# Patient Record
Sex: FEMALE | Race: WHITE | NOT HISPANIC OR LATINO | Employment: OTHER | URBAN - NONMETROPOLITAN AREA
[De-identification: names, ages, dates, MRNs, and addresses within clinical notes are randomized per-mention and may not be internally consistent; named-entity substitution may affect disease eponyms.]

---

## 2017-02-02 ENCOUNTER — OFFICE VISIT (OUTPATIENT)
Dept: FAMILY MEDICINE CLINIC | Facility: CLINIC | Age: 37
End: 2017-02-02

## 2017-02-02 VITALS
OXYGEN SATURATION: 98 % | SYSTOLIC BLOOD PRESSURE: 118 MMHG | HEIGHT: 66 IN | DIASTOLIC BLOOD PRESSURE: 80 MMHG | WEIGHT: 226 LBS | BODY MASS INDEX: 36.32 KG/M2 | HEART RATE: 98 BPM

## 2017-02-02 DIAGNOSIS — R11.0 NAUSEA: ICD-10-CM

## 2017-02-02 DIAGNOSIS — R10.11 RIGHT UPPER QUADRANT ABDOMINAL PAIN: ICD-10-CM

## 2017-02-02 DIAGNOSIS — R14.0 BLOATING: ICD-10-CM

## 2017-02-02 DIAGNOSIS — R19.7 DIARRHEA, UNSPECIFIED TYPE: ICD-10-CM

## 2017-02-02 LAB
DEPRECATED RDW RBC AUTO: 43.7 FL (ref 37–54)
ERYTHROCYTE [DISTWIDTH] IN BLOOD BY AUTOMATED COUNT: 13.9 % (ref 11.3–14.5)
HCT VFR BLD AUTO: 44 % (ref 34.5–44)
HGB BLD-MCNC: 15 G/DL (ref 11.5–15.5)
MCH RBC QN AUTO: 29.4 PG (ref 27–31)
MCHC RBC AUTO-ENTMCNC: 34.1 G/DL (ref 32–36)
MCV RBC AUTO: 86.3 FL (ref 80–99)
PLATELET # BLD AUTO: 168 10*3/MM3 (ref 150–450)
PMV BLD AUTO: 12.1 FL (ref 6–12)
RBC # BLD AUTO: 5.1 10*6/MM3 (ref 3.89–5.14)
WBC NRBC COR # BLD: 9.21 10*3/MM3 (ref 3.5–10.8)

## 2017-02-02 PROCEDURE — 99214 OFFICE O/P EST MOD 30 MIN: CPT | Performed by: NURSE PRACTITIONER

## 2017-02-02 PROCEDURE — 85027 COMPLETE CBC AUTOMATED: CPT | Performed by: NURSE PRACTITIONER

## 2017-02-02 PROCEDURE — 36415 COLL VENOUS BLD VENIPUNCTURE: CPT | Performed by: NURSE PRACTITIONER

## 2017-02-02 RX ORDER — PANTOPRAZOLE SODIUM 40 MG/1
40 TABLET, DELAYED RELEASE ORAL DAILY
Qty: 30 TABLET | Refills: 2 | Status: SHIPPED | OUTPATIENT
Start: 2017-02-02 | End: 2017-03-04

## 2017-02-02 NOTE — PROGRESS NOTES
"Subjective   September Jordan Weller is a 36 y.o. female.     HPI Comments: The patient is here today for RUQ pain that has been intermittent for the past 2 months.  She complains of episodes of nausea (no vomiting), RUQ abdominal pain, bloating, belching, as well as alternating diarrhea and constipation.  She states at the beginning of her \"episodes\" she has diarrhea and then constipation.  She states she has had 3-4 episodes of the pain and symptoms over the past 2 months.  She has not tried anything to relieve her symptoms but states they resolve spontaenously.  She cannot relate the symptoms to eating or not eating or any specific food types.  She states she still has her gallbladder.        The following portions of the patient's history were reviewed and updated as appropriate: allergies, current medications, past family history, past medical history, past social history, past surgical history and problem list.    Review of Systems   Constitutional: Negative.    HENT: Negative.    Eyes: Negative.    Respiratory: Negative.    Cardiovascular: Negative.    Gastrointestinal: Positive for abdominal pain, constipation, diarrhea and nausea.        Bloating, belching   Endocrine: Negative.    Genitourinary: Negative.    Musculoskeletal: Negative.    Skin: Negative.    Allergic/Immunologic: Negative.    Neurological: Negative.  Negative for dizziness, syncope, weakness and numbness.   Hematological: Negative.  Negative for adenopathy.   Psychiatric/Behavioral: Negative for confusion and suicidal ideas. The patient is not nervous/anxious.        Objective   Physical Exam   Constitutional: She is oriented to person, place, and time. Vital signs are normal. She appears well-developed and well-nourished. No distress.   HENT:   Head: Normocephalic.   Mouth/Throat: No oropharyngeal exudate.   Eyes: Conjunctivae and lids are normal.   Neck: No tracheal deviation present. No thyromegaly present.   Cardiovascular: Normal rate, " regular rhythm, S1 normal, S2 normal and normal heart sounds.    No murmur heard.  Pulmonary/Chest: Effort normal and breath sounds normal. No respiratory distress. She has no wheezes. She has no rales. She exhibits no tenderness.   Abdominal: Soft. Normal appearance and bowel sounds are normal. She exhibits no distension and no mass. There is no hepatosplenomegaly or splenomegaly. There is tenderness in the right upper quadrant, right lower quadrant and left lower quadrant. There is no rebound and no guarding. No hernia.   Lymphadenopathy:     She has no cervical adenopathy.        Right cervical: No superficial cervical, no deep cervical and no posterior cervical adenopathy present.       Left cervical: No superficial cervical, no deep cervical and no posterior cervical adenopathy present.   Neurological: She is alert and oriented to person, place, and time. Coordination and gait normal.   Skin: Skin is warm, dry and intact. No rash noted.   Psychiatric: She has a normal mood and affect. Her speech is normal and behavior is normal. Judgment and thought content normal.   Nursing note and vitals reviewed.      Assessment/Plan   September was seen today for abdominal pain.    Diagnoses and all orders for this visit:    Nausea  -     pantoprazole (PROTONIX) 40 MG EC tablet; Take 1 tablet by mouth Daily for 30 days.  -     US Gallbladder; Future  -     CBC (No Diff)  -     Helicobacter Pylori, IgA IgG IgM    Diarrhea, unspecified type  -     pantoprazole (PROTONIX) 40 MG EC tablet; Take 1 tablet by mouth Daily for 30 days.  -     US Gallbladder; Future  -     CBC (No Diff)  -     Helicobacter Pylori, IgA IgG IgM    Bloating  -     pantoprazole (PROTONIX) 40 MG EC tablet; Take 1 tablet by mouth Daily for 30 days.  -     US Gallbladder; Future  -     CBC (No Diff)  -     Helicobacter Pylori, IgA IgG IgM    Right upper quadrant abdominal pain  -     pantoprazole (PROTONIX) 40 MG EC tablet; Take 1 tablet by mouth Daily for  30 days.  -     US Gallbladder; Future  -     CBC (No Diff)  -     Helicobacter Pylori, IgA IgG IgM     Protonix prescribed for control of patient's nausea, diarrhea, bloating, and RUQ abdominal pain.  The patient was instructed to take the medication daily on an empty stomach 30 minutes prior to eating.      An ultra sound of her gallbladder was also ordered to evaluate the cause of her symptoms.  A CBC was drawn today in clinic to assess for inflammation signs related to her symptoms.  The patient was also tested for H-Pylori with a lab draw due to her ongoing symptoms.      Patient voiced understanding of all instructions and denied further questions.  Patient was encouraged to keep me informed of any acute changes, lack of improvement, or any new concerning symptoms.  .I will contact patient regarding test results and provide instructions regarding any necessary changes in plan of care    Patient to return to clinic PRN symptoms worsening and based on results of her ordered tests.

## 2017-02-03 ENCOUNTER — HOSPITAL ENCOUNTER (OUTPATIENT)
Dept: ULTRASOUND IMAGING | Facility: HOSPITAL | Age: 37
Discharge: HOME | End: 2017-02-03
Admitting: NURSE PRACTITIONER

## 2017-02-03 DIAGNOSIS — R10.11 RIGHT UPPER QUADRANT ABDOMINAL PAIN: ICD-10-CM

## 2017-02-03 DIAGNOSIS — R19.7 DIARRHEA, UNSPECIFIED TYPE: ICD-10-CM

## 2017-02-03 DIAGNOSIS — R11.0 NAUSEA: ICD-10-CM

## 2017-02-03 DIAGNOSIS — R14.0 BLOATING: ICD-10-CM

## 2017-02-03 PROCEDURE — 76705 ECHO EXAM OF ABDOMEN: CPT

## 2017-02-06 ENCOUNTER — TELEPHONE (OUTPATIENT)
Dept: FAMILY MEDICINE CLINIC | Facility: CLINIC | Age: 37
End: 2017-02-06

## 2017-02-06 LAB
H PYLORI IGA SER IA-ACNC: <9 UNITS (ref 0–8.9)
H PYLORI IGG SER IA-ACNC: <0.9 U/ML (ref 0–0.8)
H PYLORI IGM SER-ACNC: <9 UNITS (ref 0–8.9)

## 2017-02-06 NOTE — TELEPHONE ENCOUNTER
----- Message from Ashlyn Saldana MA sent at 2/6/2017  2:41 PM EST -----  Pt notified of results    ----- Message -----     From: GERSON Churchill     Sent: 2/3/2017  11:14 AM       To: Ashlyn Saldana MA    Her labs are all normal

## 2017-02-07 ENCOUNTER — TELEPHONE (OUTPATIENT)
Dept: INTERNAL MEDICINE | Facility: CLINIC | Age: 37
End: 2017-02-07

## 2017-02-07 ENCOUNTER — TELEPHONE (OUTPATIENT)
Dept: FAMILY MEDICINE CLINIC | Facility: CLINIC | Age: 37
End: 2017-02-07

## 2017-02-07 DIAGNOSIS — K82.8 SLUDGE IN GALLBLADDER: Primary | ICD-10-CM

## 2017-02-07 DIAGNOSIS — R11.0 NAUSEA: ICD-10-CM

## 2017-02-07 DIAGNOSIS — K80.20 CHOLELITHIASIS WITHOUT CHOLECYSTITIS: ICD-10-CM

## 2017-02-07 DIAGNOSIS — R10.11 RIGHT UPPER QUADRANT ABDOMINAL PAIN: ICD-10-CM

## 2017-02-07 DIAGNOSIS — R10.11 RIGHT UPPER QUADRANT PAIN: Primary | ICD-10-CM

## 2017-02-07 NOTE — TELEPHONE ENCOUNTER
----- Message from Ashlyn Saldana MA sent at 2/7/2017  6:42 PM EST -----  Pt notified of results    ----- Message -----     From: GERSON Churchill     Sent: 2/7/2017   8:51 AM       To: Ashlyn Saldana MA    The bacteria in her stomach was negative

## 2017-02-14 ENCOUNTER — HOSPITAL ENCOUNTER (OUTPATIENT)
Dept: NUCLEAR MEDICINE | Facility: HOSPITAL | Age: 37
Discharge: HOME | End: 2017-02-14

## 2017-02-14 DIAGNOSIS — R10.11 RIGHT UPPER QUADRANT ABDOMINAL PAIN: ICD-10-CM

## 2017-02-14 DIAGNOSIS — K82.8 SLUDGE IN GALLBLADDER: ICD-10-CM

## 2017-02-14 DIAGNOSIS — R11.0 NAUSEA: ICD-10-CM

## 2017-02-14 PROCEDURE — A9537 TC99M MEBROFENIN: HCPCS | Performed by: FAMILY MEDICINE

## 2017-02-14 PROCEDURE — 78227 HEPATOBIL SYST IMAGE W/DRUG: CPT

## 2017-02-14 PROCEDURE — 0 TECHNETIUM TC 99M MEBROFENIN KIT: Performed by: FAMILY MEDICINE

## 2017-02-14 RX ORDER — KIT FOR THE PREPARATION OF TECHNETIUM TC 99M MEBROFENIN 45 MG/10ML
1 INJECTION, POWDER, LYOPHILIZED, FOR SOLUTION INTRAVENOUS
Status: COMPLETED | OUTPATIENT
Start: 2017-02-14 | End: 2017-02-14

## 2017-02-14 RX ADMIN — MEBROFENIN 1 DOSE: 45 INJECTION, POWDER, LYOPHILIZED, FOR SOLUTION INTRAVENOUS at 11:50

## 2017-02-16 ENCOUNTER — TELEPHONE (OUTPATIENT)
Dept: FAMILY MEDICINE CLINIC | Facility: CLINIC | Age: 37
End: 2017-02-16

## 2017-02-16 NOTE — TELEPHONE ENCOUNTER
----- Message from Ashlyn Saldana MA sent at 2/16/2017  6:02 PM EST -----   Pt notified and she said that she was going to call Dr Ramírez office tomorrow and ask her if she still wants her to see the gen surgeon she scheduled her for or what she wants her to do.    ----- Message -----     From: Ashlyn Saldana MA     Sent: 2/14/2017   6:47 PM       To: Ashlyn Saldana MA     Tried calling pt no answer    ----- Message -----     From: GERSON Churchill     Sent: 2/14/2017   4:25 PM       To: Ashlyn Saldana MA    Her HIDA scan is normal, so if she is still having problems with her stomach, I will refer her to GI

## 2017-02-20 ENCOUNTER — OFFICE VISIT (OUTPATIENT)
Dept: SURGERY | Facility: CLINIC | Age: 37
End: 2017-02-20

## 2017-02-20 VITALS
HEART RATE: 80 BPM | WEIGHT: 227 LBS | TEMPERATURE: 97.4 F | SYSTOLIC BLOOD PRESSURE: 102 MMHG | OXYGEN SATURATION: 99 % | DIASTOLIC BLOOD PRESSURE: 64 MMHG | BODY MASS INDEX: 36.48 KG/M2 | HEIGHT: 66 IN

## 2017-02-20 DIAGNOSIS — R10.11 RUQ PAIN: Primary | ICD-10-CM

## 2017-02-20 DIAGNOSIS — Z01.818 PRE-OP EXAM: ICD-10-CM

## 2017-02-20 DIAGNOSIS — R11.0 NAUSEA: ICD-10-CM

## 2017-02-20 PROCEDURE — 99202 OFFICE O/P NEW SF 15 MIN: CPT | Performed by: SURGERY

## 2017-02-20 NOTE — PROGRESS NOTES
SABRINA Mcarthur MD  New Clinic Visit/ H&P    [place]        1980 02/20/2017  Faviola Ramírez MD    Chief Complaint   Patient presents with   • Cholelithiasis     Patient c/o RUQ pain, nausea,and diarrhea per 3 months. US of Gallbladder doen 2/3/17. Hida scan done 2/14/17    patient has classic gallbladder symptoms in the right upper quadrant associated with nausea, fatty food intolerances, some rushes to the bathroom, and some heavy gaseous distention.  Ultrasound of her gallbladder showed some sludge.  Her HIDA scan was normal.  No past medical history on file.  Past Surgical History   Procedure Laterality Date   • Knee surgery       2001   • Tubal abdominal ligation       2002     No Known Allergies  Family History   Problem Relation Age of Onset   • Diabetes Mother    • Heart failure Maternal Grandmother    • Heart attack Paternal Grandmother      Social History     Social History   • Marital status:      Spouse name: N/A   • Number of children: N/A   • Years of education: N/A     Occupational History   • Not on file.     Social History Main Topics   • Smoking status: Never Smoker   • Smokeless tobacco: Not on file   • Alcohol use No   • Drug use: No   • Sexual activity: Not on file     Other Topics Concern   • Not on file     Social History Narrative   • No narrative on file     Review of Systems   Constitutional: Negative.    HENT: Negative.    Eyes: Negative.    Respiratory: Negative.    Cardiovascular: Negative.    Gastrointestinal: Positive for abdominal distention, abdominal pain, constipation, diarrhea and nausea.   Endocrine: Negative.    Genitourinary: Negative.    Musculoskeletal: Negative.    Skin: Negative.    Allergic/Immunologic: Negative.    Neurological: Positive for dizziness.   Hematological: Negative.    Psychiatric/Behavioral: Negative.      Vitals:    02/20/17 1336   BP: 102/64   Pulse: 80   Temp: 97.4 °F (36.3 °C)   SpO2: 99%     Physical Exam   The patient is alert and  oriented with no jaundice and no symptoms presently.  Procedures     Assessment/Diagnosis  Biliary dyskinesia  Probable cholelithiasis.  Additional:  Plan:    Feel like she needs to have her gallbladder removed and she is agreeable.  Workup started.      20 minutes was spent face-to-face with patient counseling and discussing procedures     VLADIMIR Mcarthur MD

## 2017-03-20 ENCOUNTER — APPOINTMENT (OUTPATIENT)
Dept: PREADMISSION TESTING | Facility: HOSPITAL | Age: 37
End: 2017-03-20

## 2017-03-20 ENCOUNTER — HOSPITAL ENCOUNTER (OUTPATIENT)
Dept: GENERAL RADIOLOGY | Facility: HOSPITAL | Age: 37
Discharge: HOME | End: 2017-03-20
Admitting: SURGERY

## 2017-03-20 VITALS
SYSTOLIC BLOOD PRESSURE: 125 MMHG | WEIGHT: 220 LBS | HEIGHT: 66 IN | BODY MASS INDEX: 35.36 KG/M2 | DIASTOLIC BLOOD PRESSURE: 75 MMHG

## 2017-03-20 DIAGNOSIS — Z01.818 PRE-OP EXAM: ICD-10-CM

## 2017-03-20 LAB
ALBUMIN SERPL-MCNC: 4.5 G/DL (ref 3.5–5)
ALBUMIN/GLOB SERPL: 1.5 G/DL (ref 1–2)
ALP SERPL-CCNC: 63 U/L (ref 38–126)
ALT SERPL W P-5'-P-CCNC: 23 U/L (ref 13–69)
ANION GAP SERPL CALCULATED.3IONS-SCNC: 10.6 MMOL/L
AST SERPL-CCNC: 27 U/L (ref 15–46)
B-HCG UR QL: NEGATIVE
BACTERIA UR QL AUTO: ABNORMAL /HPF
BASOPHILS # BLD AUTO: 0.03 10*3/MM3 (ref 0–0.2)
BASOPHILS NFR BLD AUTO: 0.5 % (ref 0–2.5)
BILIRUB SERPL-MCNC: 0.6 MG/DL (ref 0.2–1.3)
BILIRUB UR QL STRIP: NEGATIVE
BUN BLD-MCNC: 10 MG/DL (ref 7–20)
BUN/CREAT SERPL: 11.1 (ref 7.1–23.5)
CALCIUM SPEC-SCNC: 9.6 MG/DL (ref 8.4–10.2)
CHLORIDE SERPL-SCNC: 104 MMOL/L (ref 98–107)
CLARITY UR: CLEAR
CO2 SERPL-SCNC: 30 MMOL/L (ref 26–30)
COLOR UR: YELLOW
CREAT BLD-MCNC: 0.9 MG/DL (ref 0.6–1.3)
DEPRECATED RDW RBC AUTO: 40.2 FL (ref 37–54)
EOSINOPHIL # BLD AUTO: 0.13 10*3/MM3 (ref 0–0.7)
EOSINOPHIL NFR BLD AUTO: 2.3 % (ref 0–7)
ERYTHROCYTE [DISTWIDTH] IN BLOOD BY AUTOMATED COUNT: 13.1 % (ref 11.5–14.5)
GFR SERPL CREATININE-BSD FRML MDRD: 70 ML/MIN/1.73
GLOBULIN UR ELPH-MCNC: 3.1 GM/DL
GLUCOSE BLD-MCNC: 66 MG/DL (ref 74–98)
GLUCOSE UR STRIP-MCNC: NEGATIVE MG/DL
HCT VFR BLD AUTO: 40.8 % (ref 37–47)
HGB BLD-MCNC: 14 G/DL (ref 12–16)
HGB UR QL STRIP.AUTO: NEGATIVE
HYALINE CASTS UR QL AUTO: ABNORMAL /LPF
IMM GRANULOCYTES # BLD: 0.02 10*3/MM3 (ref 0–0.06)
IMM GRANULOCYTES NFR BLD: 0.4 % (ref 0–0.6)
KETONES UR QL STRIP: NEGATIVE
LEUKOCYTE ESTERASE UR QL STRIP.AUTO: NEGATIVE
LYMPHOCYTES # BLD AUTO: 1.59 10*3/MM3 (ref 0.6–3.4)
LYMPHOCYTES NFR BLD AUTO: 28.1 % (ref 10–50)
MCH RBC QN AUTO: 29.1 PG (ref 27–31)
MCHC RBC AUTO-ENTMCNC: 34.3 G/DL (ref 30–37)
MCV RBC AUTO: 84.8 FL (ref 81–99)
MONOCYTES # BLD AUTO: 0.48 10*3/MM3 (ref 0–0.9)
MONOCYTES NFR BLD AUTO: 8.5 % (ref 0–12)
NEUTROPHILS # BLD AUTO: 3.4 10*3/MM3 (ref 2–6.9)
NEUTROPHILS NFR BLD AUTO: 60.2 % (ref 37–80)
NITRITE UR QL STRIP: NEGATIVE
NRBC BLD MANUAL-RTO: 0 /100 WBC (ref 0–0)
PH UR STRIP.AUTO: 5.5 [PH] (ref 5–8)
PLATELET # BLD AUTO: 191 10*3/MM3 (ref 130–400)
PMV BLD AUTO: 11 FL (ref 6–12)
POTASSIUM BLD-SCNC: 3.6 MMOL/L (ref 3.5–5.1)
PROT SERPL-MCNC: 7.6 G/DL (ref 6.3–8.2)
PROT UR QL STRIP: NEGATIVE
RBC # BLD AUTO: 4.81 10*6/MM3 (ref 4.2–5.4)
RBC # UR: ABNORMAL /HPF
REF LAB TEST METHOD: ABNORMAL
SODIUM BLD-SCNC: 141 MMOL/L (ref 137–145)
SP GR UR STRIP: 1.01 (ref 1–1.03)
SQUAMOUS #/AREA URNS HPF: ABNORMAL /HPF
UROBILINOGEN UR QL STRIP: NORMAL
WBC NRBC COR # BLD: 5.65 10*3/MM3 (ref 4.8–10.8)
WBC UR QL AUTO: ABNORMAL /HPF

## 2017-03-20 PROCEDURE — 80053 COMPREHEN METABOLIC PANEL: CPT | Performed by: SURGERY

## 2017-03-20 PROCEDURE — 85025 COMPLETE CBC W/AUTO DIFF WBC: CPT | Performed by: SURGERY

## 2017-03-20 PROCEDURE — 93005 ELECTROCARDIOGRAM TRACING: CPT | Performed by: SURGERY

## 2017-03-20 PROCEDURE — 71020 HC CHEST PA AND LATERAL: CPT

## 2017-03-20 PROCEDURE — 81025 URINE PREGNANCY TEST: CPT | Performed by: SURGERY

## 2017-03-20 PROCEDURE — 81001 URINALYSIS AUTO W/SCOPE: CPT | Performed by: SURGERY

## 2017-03-22 ENCOUNTER — OFFICE VISIT (OUTPATIENT)
Dept: SURGERY | Facility: CLINIC | Age: 37
End: 2017-03-22

## 2017-03-22 VITALS
HEART RATE: 90 BPM | SYSTOLIC BLOOD PRESSURE: 120 MMHG | DIASTOLIC BLOOD PRESSURE: 60 MMHG | TEMPERATURE: 98.8 F | OXYGEN SATURATION: 98 % | BODY MASS INDEX: 35.36 KG/M2 | WEIGHT: 220 LBS | HEIGHT: 66 IN

## 2017-03-22 DIAGNOSIS — K80.20 CALCULUS OF GALLBLADDER WITHOUT CHOLECYSTITIS WITHOUT OBSTRUCTION: Primary | ICD-10-CM

## 2017-03-22 PROCEDURE — 99213 OFFICE O/P EST LOW 20 MIN: CPT | Performed by: SURGERY

## 2017-03-22 NOTE — PROGRESS NOTES
BRIGIDA Mcarthur MD  Follow-up/Clinic Visit    Margoth Weller        1980 03/22/2017  Faviola Ramírez MD    Chief Complaint   Patient presents with   • Pre-Op Visit     Cholecystectomy sheduled for 3/27/17    she has been pretty much symptom-free while she was staying on the diet.  She did have symptoms one time when she had green leafy vegetables.  Review of Systems   Constitutional: Negative.    HENT: Negative.    Eyes: Negative.    Respiratory: Negative.    Cardiovascular: Positive for leg swelling.   Gastrointestinal: Positive for abdominal pain, diarrhea and nausea.   Endocrine: Negative.    Genitourinary: Negative.    Musculoskeletal: Negative.    Skin: Negative.    Allergic/Immunologic: Negative.    Neurological: Negative.    Hematological: Negative.    Psychiatric/Behavioral: Negative.     legs will swell some if she sits for long periods  No Known Allergies  Vitals:    03/22/17 0905   BP: 120/60   Pulse: 90   Temp: 98.8 °F (37.1 °C)   SpO2: 98%     Physical Exam   Alert and oriented and cooperative  EENT: Not remarkable  Neck: Not remarkable  Chest: Clear  Heart: Regular with no murmurs  Abdomen: She has scars at her umbilicus where she has had previous laparoscopic procedures.  Extremities: No edema  Procedures     Assessment/Diagnosis  Cholelithiasis  Additional:  Plan: Cholecystectomy and operative cholangiography      :15 minutes was spent face-to-face with the patient counseling and discussing procedure on     VLADIMIR Mcarthur MD

## 2017-03-27 ENCOUNTER — APPOINTMENT (OUTPATIENT)
Dept: GENERAL RADIOLOGY | Facility: HOSPITAL | Age: 37
End: 2017-03-27
Attending: SURGERY

## 2017-03-27 ENCOUNTER — ANESTHESIA EVENT (OUTPATIENT)
Dept: PERIOP | Facility: HOSPITAL | Age: 37
End: 2017-03-27

## 2017-03-27 ENCOUNTER — HOSPITAL ENCOUNTER (OUTPATIENT)
Facility: HOSPITAL | Age: 37
Setting detail: SURGERY ADMIT
Discharge: HOME | End: 2017-03-27
Attending: SURGERY | Admitting: SURGERY

## 2017-03-27 ENCOUNTER — ANESTHESIA (OUTPATIENT)
Dept: PERIOP | Facility: HOSPITAL | Age: 37
End: 2017-03-27

## 2017-03-27 VITALS
RESPIRATION RATE: 18 BRPM | DIASTOLIC BLOOD PRESSURE: 75 MMHG | SYSTOLIC BLOOD PRESSURE: 126 MMHG | OXYGEN SATURATION: 96 % | TEMPERATURE: 97.2 F | HEART RATE: 76 BPM

## 2017-03-27 DIAGNOSIS — R10.11 RUQ PAIN: ICD-10-CM

## 2017-03-27 DIAGNOSIS — R11.0 NAUSEA: ICD-10-CM

## 2017-03-27 LAB — B-HCG UR QL: NEGATIVE

## 2017-03-27 PROCEDURE — 74000 HC ABDOMEN KUB: CPT

## 2017-03-27 PROCEDURE — C1758 CATHETER, URETERAL: HCPCS | Performed by: SURGERY

## 2017-03-27 PROCEDURE — 25010000002 MIDAZOLAM PER 1 MG: Performed by: NURSE ANESTHETIST, CERTIFIED REGISTERED

## 2017-03-27 PROCEDURE — 25010000002 PROPOFOL 200 MG/20ML EMULSION: Performed by: NURSE ANESTHETIST, CERTIFIED REGISTERED

## 2017-03-27 PROCEDURE — 81025 URINE PREGNANCY TEST: CPT | Performed by: SURGERY

## 2017-03-27 PROCEDURE — 25010000002 MIDAZOLAM PER 1 MG

## 2017-03-27 PROCEDURE — 25010000002 FENTANYL CITRATE (PF) 100 MCG/2ML SOLUTION: Performed by: NURSE ANESTHETIST, CERTIFIED REGISTERED

## 2017-03-27 PROCEDURE — 25010000002 HYDROMORPHONE PER 4 MG

## 2017-03-27 PROCEDURE — 47562 LAPAROSCOPIC CHOLECYSTECTOMY: CPT | Performed by: SURGERY

## 2017-03-27 PROCEDURE — 25010000002 HYDROMORPHONE PER 4 MG: Performed by: NURSE ANESTHETIST, CERTIFIED REGISTERED

## 2017-03-27 PROCEDURE — 25010000002 KETOROLAC TROMETHAMINE PER 15 MG: Performed by: NURSE ANESTHETIST, CERTIFIED REGISTERED

## 2017-03-27 PROCEDURE — 25010000002 MEPERIDINE PER 100 MG

## 2017-03-27 PROCEDURE — 0 IOVERSOL 64 % SOLUTION: Performed by: SURGERY

## 2017-03-27 PROCEDURE — 25010000003 AMPICILLIN-SULBACTAM PER 1.5 G: Performed by: SURGERY

## 2017-03-27 PROCEDURE — 25010000002 SUCCINYLCHOLINE PER 20 MG: Performed by: NURSE ANESTHETIST, CERTIFIED REGISTERED

## 2017-03-27 RX ORDER — ROCURONIUM BROMIDE 10 MG/ML
INJECTION, SOLUTION INTRAVENOUS AS NEEDED
Status: DISCONTINUED | OUTPATIENT
Start: 2017-03-27 | End: 2017-03-27 | Stop reason: SURG

## 2017-03-27 RX ORDER — ACETAMINOPHEN 10 MG/ML
INJECTION, SOLUTION INTRAVENOUS
Status: COMPLETED
Start: 2017-03-27 | End: 2017-03-27

## 2017-03-27 RX ORDER — MIDAZOLAM HYDROCHLORIDE 1 MG/ML
2 INJECTION INTRAMUSCULAR; INTRAVENOUS ONCE
Status: COMPLETED | OUTPATIENT
Start: 2017-03-27 | End: 2017-03-27

## 2017-03-27 RX ORDER — NAPROXEN SODIUM 220 MG
220 TABLET ORAL 2 TIMES DAILY PRN
COMMUNITY
End: 2017-04-05

## 2017-03-27 RX ORDER — PROMETHAZINE HYDROCHLORIDE 25 MG/1
25 TABLET ORAL ONCE AS NEEDED
Status: DISCONTINUED | OUTPATIENT
Start: 2017-03-27 | End: 2017-03-27 | Stop reason: HOSPADM

## 2017-03-27 RX ORDER — HYDROMORPHONE HCL 110MG/55ML
PATIENT CONTROLLED ANALGESIA SYRINGE INTRAVENOUS AS NEEDED
Status: DISCONTINUED | OUTPATIENT
Start: 2017-03-27 | End: 2017-03-27 | Stop reason: SURG

## 2017-03-27 RX ORDER — SODIUM CHLORIDE 0.9 % (FLUSH) 0.9 %
1-10 SYRINGE (ML) INJECTION AS NEEDED
Status: DISCONTINUED | OUTPATIENT
Start: 2017-03-27 | End: 2017-03-27 | Stop reason: HOSPADM

## 2017-03-27 RX ORDER — SCOLOPAMINE TRANSDERMAL SYSTEM 1 MG/1
1 PATCH, EXTENDED RELEASE TRANSDERMAL ONCE
Status: DISCONTINUED | OUTPATIENT
Start: 2017-03-27 | End: 2017-03-27 | Stop reason: HOSPADM

## 2017-03-27 RX ORDER — PROMETHAZINE HYDROCHLORIDE 25 MG/ML
6.25 INJECTION, SOLUTION INTRAMUSCULAR; INTRAVENOUS ONCE AS NEEDED
Status: DISCONTINUED | OUTPATIENT
Start: 2017-03-27 | End: 2017-03-27 | Stop reason: HOSPADM

## 2017-03-27 RX ORDER — BACITRACIN 50000 [USP'U]/1
INJECTION, POWDER, LYOPHILIZED, FOR SOLUTION INTRAMUSCULAR
Status: DISCONTINUED
Start: 2017-03-27 | End: 2017-03-27 | Stop reason: HOSPADM

## 2017-03-27 RX ORDER — MEPERIDINE HYDROCHLORIDE 25 MG/ML
INJECTION INTRAMUSCULAR; INTRAVENOUS; SUBCUTANEOUS
Status: COMPLETED
Start: 2017-03-27 | End: 2017-03-27

## 2017-03-27 RX ORDER — FENTANYL CITRATE 50 UG/ML
INJECTION, SOLUTION INTRAMUSCULAR; INTRAVENOUS AS NEEDED
Status: DISCONTINUED | OUTPATIENT
Start: 2017-03-27 | End: 2017-03-27 | Stop reason: SURG

## 2017-03-27 RX ORDER — AMOXICILLIN AND CLAVULANATE POTASSIUM 875; 125 MG/1; MG/1
1 TABLET, FILM COATED ORAL 2 TIMES DAILY
Qty: 7 TABLET | Refills: 0 | Status: SHIPPED | OUTPATIENT
Start: 2017-03-27 | End: 2017-03-31

## 2017-03-27 RX ORDER — MIDAZOLAM HYDROCHLORIDE 1 MG/ML
INJECTION INTRAMUSCULAR; INTRAVENOUS
Status: COMPLETED
Start: 2017-03-27 | End: 2017-03-27

## 2017-03-27 RX ORDER — BUPIVACAINE HYDROCHLORIDE AND EPINEPHRINE 5; 5 MG/ML; UG/ML
INJECTION, SOLUTION EPIDURAL; INTRACAUDAL; PERINEURAL
Status: DISCONTINUED
Start: 2017-03-27 | End: 2017-03-27 | Stop reason: HOSPADM

## 2017-03-27 RX ORDER — MEPERIDINE HYDROCHLORIDE 25 MG/ML
12.5 INJECTION INTRAMUSCULAR; INTRAVENOUS; SUBCUTANEOUS
Status: DISCONTINUED | OUTPATIENT
Start: 2017-03-27 | End: 2017-03-27 | Stop reason: HOSPADM

## 2017-03-27 RX ORDER — ONDANSETRON 2 MG/ML
4 INJECTION INTRAMUSCULAR; INTRAVENOUS ONCE AS NEEDED
Status: DISCONTINUED | OUTPATIENT
Start: 2017-03-27 | End: 2017-03-27 | Stop reason: HOSPADM

## 2017-03-27 RX ORDER — MEPERIDINE HYDROCHLORIDE 25 MG/ML
12.5 INJECTION INTRAMUSCULAR; INTRAVENOUS; SUBCUTANEOUS ONCE
Status: COMPLETED | OUTPATIENT
Start: 2017-03-27 | End: 2017-03-27

## 2017-03-27 RX ORDER — MIDAZOLAM HYDROCHLORIDE 1 MG/ML
INJECTION INTRAMUSCULAR; INTRAVENOUS AS NEEDED
Status: DISCONTINUED | OUTPATIENT
Start: 2017-03-27 | End: 2017-03-27 | Stop reason: SURG

## 2017-03-27 RX ORDER — KETOROLAC TROMETHAMINE 30 MG/ML
INJECTION, SOLUTION INTRAMUSCULAR; INTRAVENOUS AS NEEDED
Status: DISCONTINUED | OUTPATIENT
Start: 2017-03-27 | End: 2017-03-27 | Stop reason: SURG

## 2017-03-27 RX ORDER — GLYCOPYRROLATE 0.2 MG/ML
INJECTION INTRAMUSCULAR; INTRAVENOUS
Status: COMPLETED
Start: 2017-03-27 | End: 2017-03-27

## 2017-03-27 RX ORDER — SUCCINYLCHOLINE CHLORIDE 20 MG/ML
INJECTION INTRAMUSCULAR; INTRAVENOUS AS NEEDED
Status: DISCONTINUED | OUTPATIENT
Start: 2017-03-27 | End: 2017-03-27 | Stop reason: SURG

## 2017-03-27 RX ORDER — OXYCODONE HYDROCHLORIDE AND ACETAMINOPHEN 5; 325 MG/1; MG/1
TABLET ORAL
Status: DISPENSED
Start: 2017-03-27 | End: 2017-03-28

## 2017-03-27 RX ORDER — OXYCODONE HYDROCHLORIDE AND ACETAMINOPHEN 5; 325 MG/1; MG/1
1 TABLET ORAL EVERY 6 HOURS PRN
Qty: 20 TABLET | Refills: 0 | Status: SHIPPED | OUTPATIENT
Start: 2017-03-27 | End: 2017-03-30

## 2017-03-27 RX ORDER — GLYCOPYRROLATE 0.2 MG/ML
0.2 INJECTION INTRAMUSCULAR; INTRAVENOUS ONCE
Status: COMPLETED | OUTPATIENT
Start: 2017-03-27 | End: 2017-03-27

## 2017-03-27 RX ORDER — BUPIVACAINE HYDROCHLORIDE 5 MG/ML
INJECTION, SOLUTION EPIDURAL; INTRACAUDAL
Status: DISCONTINUED
Start: 2017-03-27 | End: 2017-03-27 | Stop reason: WASHOUT

## 2017-03-27 RX ORDER — LIDOCAINE HYDROCHLORIDE 10 MG/ML
INJECTION, SOLUTION INFILTRATION; PERINEURAL
Status: DISCONTINUED
Start: 2017-03-27 | End: 2017-03-27 | Stop reason: WASHOUT

## 2017-03-27 RX ORDER — PROPOFOL 10 MG/ML
INJECTION, EMULSION INTRAVENOUS AS NEEDED
Status: DISCONTINUED | OUTPATIENT
Start: 2017-03-27 | End: 2017-03-27 | Stop reason: SURG

## 2017-03-27 RX ORDER — SCOLOPAMINE TRANSDERMAL SYSTEM 1 MG/1
PATCH, EXTENDED RELEASE TRANSDERMAL
Status: DISCONTINUED
Start: 2017-03-27 | End: 2017-03-27 | Stop reason: HOSPADM

## 2017-03-27 RX ORDER — OXYCODONE HYDROCHLORIDE AND ACETAMINOPHEN 5; 325 MG/1; MG/1
1 TABLET ORAL EVERY 6 HOURS PRN
Status: COMPLETED | OUTPATIENT
Start: 2017-03-27 | End: 2017-03-27

## 2017-03-27 RX ORDER — PROMETHAZINE HYDROCHLORIDE 25 MG/1
25 SUPPOSITORY RECTAL ONCE AS NEEDED
Status: DISCONTINUED | OUTPATIENT
Start: 2017-03-27 | End: 2017-03-27 | Stop reason: HOSPADM

## 2017-03-27 RX ORDER — BUPIVACAINE HYDROCHLORIDE AND EPINEPHRINE 5; 5 MG/ML; UG/ML
INJECTION, SOLUTION PERINEURAL AS NEEDED
Status: DISCONTINUED | OUTPATIENT
Start: 2017-03-27 | End: 2017-03-27 | Stop reason: HOSPADM

## 2017-03-27 RX ORDER — SODIUM CHLORIDE, SODIUM LACTATE, POTASSIUM CHLORIDE, CALCIUM CHLORIDE 600; 310; 30; 20 MG/100ML; MG/100ML; MG/100ML; MG/100ML
100 INJECTION, SOLUTION INTRAVENOUS CONTINUOUS PRN
Status: DISCONTINUED | OUTPATIENT
Start: 2017-03-27 | End: 2017-03-27 | Stop reason: HOSPADM

## 2017-03-27 RX ADMIN — SUCCINYLCHOLINE CHLORIDE 120 MG: 20 INJECTION, SOLUTION INTRAMUSCULAR; INTRAVENOUS at 09:11

## 2017-03-27 RX ADMIN — PROPOFOL 200 MG: 10 INJECTION, EMULSION INTRAVENOUS at 09:11

## 2017-03-27 RX ADMIN — GLYCOPYRROLATE 0.2 MG: 0.2 INJECTION, SOLUTION INTRAMUSCULAR; INTRAVENOUS at 08:58

## 2017-03-27 RX ADMIN — GLYCOPYRROLATE 0.2 MG: 0.2 INJECTION INTRAMUSCULAR; INTRAVENOUS at 08:58

## 2017-03-27 RX ADMIN — SCOLOPAMINE TRANSDERMAL SYSTEM 1 PATCH: 1 PATCH, EXTENDED RELEASE TRANSDERMAL at 08:59

## 2017-03-27 RX ADMIN — ACETAMINOPHEN 1000 MG: 10 INJECTION, SOLUTION INTRAVENOUS at 09:19

## 2017-03-27 RX ADMIN — ROCURONIUM BROMIDE 5 MG: 10 INJECTION INTRAVENOUS at 09:11

## 2017-03-27 RX ADMIN — HYDROMORPHONE HYDROCHLORIDE 0.5 MG: 2 INJECTION, SOLUTION INTRAMUSCULAR; INTRAVENOUS; SUBCUTANEOUS at 10:38

## 2017-03-27 RX ADMIN — ROCURONIUM BROMIDE 10 MG: 10 INJECTION INTRAVENOUS at 10:09

## 2017-03-27 RX ADMIN — MIDAZOLAM HYDROCHLORIDE 2 MG: 1 INJECTION INTRAMUSCULAR; INTRAVENOUS at 08:57

## 2017-03-27 RX ADMIN — ROCURONIUM BROMIDE 10 MG: 10 INJECTION INTRAVENOUS at 09:54

## 2017-03-27 RX ADMIN — MIDAZOLAM HYDROCHLORIDE 2 MG: 1 INJECTION, SOLUTION INTRAMUSCULAR; INTRAVENOUS at 08:57

## 2017-03-27 RX ADMIN — HYDROMORPHONE HYDROCHLORIDE 0.5 MG: 2 INJECTION, SOLUTION INTRAMUSCULAR; INTRAVENOUS; SUBCUTANEOUS at 10:28

## 2017-03-27 RX ADMIN — LIDOCAINE HYDROCHLORIDE 60 MG: 20 INJECTION, SOLUTION INTRAVENOUS at 09:11

## 2017-03-27 RX ADMIN — FENTANYL CITRATE 50 MCG: 50 INJECTION, SOLUTION INTRAMUSCULAR; INTRAVENOUS at 09:47

## 2017-03-27 RX ADMIN — SCOPALAMINE 1 PATCH: 1 PATCH, EXTENDED RELEASE TRANSDERMAL at 08:59

## 2017-03-27 RX ADMIN — ROCURONIUM BROMIDE 10 MG: 10 INJECTION INTRAVENOUS at 09:39

## 2017-03-27 RX ADMIN — FENTANYL CITRATE 100 MCG: 50 INJECTION, SOLUTION INTRAMUSCULAR; INTRAVENOUS at 09:10

## 2017-03-27 RX ADMIN — SODIUM CHLORIDE, POTASSIUM CHLORIDE, SODIUM LACTATE AND CALCIUM CHLORIDE: 600; 310; 30; 20 INJECTION, SOLUTION INTRAVENOUS at 10:34

## 2017-03-27 RX ADMIN — MEPERIDINE HYDROCHLORIDE 12.5 MG: 25 INJECTION, SOLUTION INTRAMUSCULAR; INTRAVENOUS; SUBCUTANEOUS at 08:59

## 2017-03-27 RX ADMIN — KETOROLAC TROMETHAMINE 30 MG: 30 INJECTION, SOLUTION INTRAMUSCULAR at 10:39

## 2017-03-27 RX ADMIN — SODIUM CHLORIDE, POTASSIUM CHLORIDE, SODIUM LACTATE AND CALCIUM CHLORIDE 100 ML/HR: 600; 310; 30; 20 INJECTION, SOLUTION INTRAVENOUS at 07:47

## 2017-03-27 RX ADMIN — OXYCODONE HYDROCHLORIDE AND ACETAMINOPHEN 1 TABLET: 5; 325 TABLET ORAL at 12:20

## 2017-03-27 RX ADMIN — HYDROMORPHONE HYDROCHLORIDE 0.5 MG: 1 INJECTION, SOLUTION INTRAMUSCULAR; INTRAVENOUS; SUBCUTANEOUS at 11:11

## 2017-03-27 RX ADMIN — Medication 0.5 MG: at 11:11

## 2017-03-27 RX ADMIN — SODIUM CHLORIDE 3 G: 9 INJECTION, SOLUTION INTRAVENOUS at 09:13

## 2017-03-27 RX ADMIN — ROCURONIUM BROMIDE 25 MG: 10 INJECTION INTRAVENOUS at 09:19

## 2017-03-27 RX ADMIN — MEPERIDINE HYDROCHLORIDE 12.5 MG: 25 INJECTION INTRAMUSCULAR; INTRAVENOUS; SUBCUTANEOUS at 08:59

## 2017-03-27 RX ADMIN — MIDAZOLAM HYDROCHLORIDE 2 MG: 1 INJECTION, SOLUTION INTRAMUSCULAR; INTRAVENOUS at 09:10

## 2017-03-27 NOTE — OP NOTE
Laparoscopic Cholecystectomy with IOC Procedure Note    Indications: This patient presents with symptomatic gallbladder disease and will undergo laparoscopic cholecystectomy.    Pre-operative Diagnosis: Chronic cholecystitis    Post-operative Diagnosis: Chronic cholecystitis    Surgeon: Aleksandr Mcarthur MD     Assistants:     Anesthesia: General endotracheal anesthesia    ASA Class: 1    Procedure Details   . The patient was taken to Operating Room, identified as September Lee Hall and the procedure verified as Laparoscopic Cholecystectomy with Intraoperative Cholangiograms. A Time Out was held and the above information confirmed.    Prior to the induction of general anesthesia, antibiotic prophylaxis was administered. General endotracheal anesthesia was then administered and tolerated well. After the induction, the abdomen was prepped in the usual sterile fashion. The patient was positioned in the supine position with the left arm comfortably tucked, along with some reverse Trendelenburg.    An inferior umbilical incision was made and Veress needle inserted and the peritoneal cavity inflated without difficulty.  5 mm port was placed and scope inserted and peritoneal cavity examined.  Showing mild enlargement of the uterus and the gallbladder was white and thickened.        the fundus grasped and retracted cephalad. Adhesions were lysed bluntly and with the electrocautery where indicated, taking care not to injure any adjacent organs or viscus. The infundibulum was grasped and retracted laterally, exposing the peritoneum overlying the triangle of Calot. This was then divided and exposed in a blunt fashion. The cystic duct was clearly identified and bluntly dissected circumferentially. The junctions of the gallbladder and cystic duct were clearly identified prior to the division of any linear structure .     An incision was made in the cystic duct and the cholangiogram catheter introduced.  was then instructed the  tubing had not been filled with saline and the catheter was removed and filled with saline.  An attempt to reenter the catheter was made and the lumen could not be entered.  Additional incision was made and the catheter again was placed after dilatation and the duct fell apart.  The hand was Easily visualized and retracted upward and there was no other structures attached.  It was then doubly hemoclipped.      The gallbladder was dissected from the liver bed in retrograde fashion with the electrocautery. The gallbladder was removed. The liver bed was irrigated and inspected.  additional Hemostasis was achieved with the electrocautery. Copious irrigation was utilized and was repeatedly aspirated until clear all particulate matter.    Pneumoperitoneum was completely reduced after viewing removal of the trocars under direct vision. No bleeding was seen.  A 0 PDS was placed here and using the Endo Close and tied affecting a good closure of the fascia.  Rest of the area and fluid was suctioned and evacuated prior to the closure.  The subcutaneous was approximated with 4-0 PDS and skin of each port with 5-0 PDS subcuticular sutures and Steri-Strips.      Findings:  Cholecystitis Cholelithiasis    Estimated Blood Loss: 4cc           Drains:            Total IV Fluids:        Specimens: Gallbladder             Complications:           Disposition: {op note disposition:3           Condition: good

## 2017-03-27 NOTE — ANESTHESIA POSTPROCEDURE EVALUATION
Patient: September Jordan Weller    Procedure Summary     Date Anesthesia Start Anesthesia Stop Room / Location    03/27/17 0904 1057  [facility] / [facility]       Procedure Diagnosis Surgeon Provider    CHOLECYSTECTOMY LAPAROSCOPIC  (N/A Abdomen) Nausea; RUQ pain  (Nausea [R11.0]; RUQ pain [R10.11]) MD Tiffany Cruz CRNA          Anesthesia Type: general  Last vitals  /72 (03/27/17 1135)    Temp 97.2 °F (36.2 °C) (03/27/17 1100)    Pulse 56 (03/27/17 1135)   Resp 12 (03/27/17 1135)    SpO2 92 % (03/27/17 1135)      Post Anesthesia Care and Evaluation    Patient location during evaluation: PACU  Patient participation: complete - patient participated  Level of consciousness: awake and alert  Pain score: 6 (pt comfortable, pain with deep breaths)  Pain management: satisfactory to patient  Airway patency: patent  Anesthetic complications: No anesthetic complications  PONV Status: none  Cardiovascular status: blood pressure returned to baseline  Respiratory status: acceptable  Hydration status: acceptable

## 2017-03-27 NOTE — ANESTHESIA PREPROCEDURE EVALUATION
Anesthesia Evaluation     Patient summary reviewed and Nursing notes reviewed   history of anesthetic complications: PONV prolonged sedation  NPO Status: > 8 hours   Airway   Mallampati: I  TM distance: >3 FB  no difficulty expected  Dental - normal exam     Comment: Perm retainer lower    Pulmonary - normal exam   (-) pneumonia, COPD, asthma, shortness of breath, sleep apnea, not a smoker, pulmonary embolism    ROS comment: CXR: NAD  Cardiovascular - normal exam  Exercise tolerance: good (4-7 METS)    ECG reviewed    (-) pacemaker, hypertension, past MI, CAD, dysrhythmias, HUTTON, cardiac stents, CABG, hyperlipidemia    ROS comment: EKG: NSR    Neuro/Psych  (+) dizziness/light headedness,    (-) seizures, TIA, CVA, tremors, weakness, numbness  GI/Hepatic/Renal/Endo    (-)  obesity, GERD, PUD, hepatitis, liver disease, renal disease, diabetes, hypothyroidism, hyperthyroidism    Musculoskeletal     (-) back pain, gait problem, joint swelling, myalgias, chronic pain      ROS comment: Plantar fasciitis  Abdominal    Substance History   (+) alcohol use (once yearly),      OB/GYN      Comment: Negative urine pregnancy      Other        (-) blood dyscrasia, arthritis, history of cancer  ROS/Med Hx Other: Vertigo                            Anesthesia Plan    ASA 2     general   (Risks and benefits discussed including risk of aspiration, recall and dental damage. All patient questions answered. Will continue with POC.    Scope patch)  intravenous induction   Anesthetic plan and risks discussed with patient.

## 2017-03-27 NOTE — PLAN OF CARE
Problem: Perioperative Period (Adult)  Intervention: Promote Pulmonary Hygiene and Secretion Clearance    03/27/17 1110   Promote Aggressive Pulmonary Hygiene/Secretion Management   Cough And Deep Breathing done with encouragement       Intervention: Monitor/Manage Pain    03/27/17 1110   Safety Interventions   Medication Review/Management medications reviewed   Manage Acute Burn Pain   Pain Management Interventions medicated

## 2017-03-27 NOTE — PLAN OF CARE
Problem: Perioperative Period (Adult)  Goal: Signs and Symptoms of Listed Potential Problems Will be Absent or Manageable (Perioperative Period)  Outcome: Ongoing (interventions implemented as appropriate)    03/27/17 0729   Perioperative Period   Problems Assessed (Perioperative Period) all   Problems Present (Perioperative Period) none

## 2017-03-27 NOTE — ANESTHESIA PROCEDURE NOTES
Airway  Urgency: elective    Airway not difficult    General Information and Staff    Patient location during procedure: OR  CRNA: SCOTT ALFARO    Indications and Patient Condition  Indications for airway management: airway protection    Preoxygenated: yes  Mask difficulty assessment: 1 - vent by mask    Final Airway Details  Final airway type: endotracheal airway      Successful airway: ETT  Cuffed: yes   Successful intubation technique: direct laryngoscopy  Facilitating devices/methods: intubating stylet  Endotracheal tube insertion site: oral  Blade: Rasheeda  Blade size: #3  ETT size: 7.0 mm  Cormack-Lehane Classification: grade IIa - partial view of glottis  Placement verified by: chest auscultation and capnometry   Cuff volume (mL): 6  Measured from: lips  ETT to lips (cm): 20  Number of attempts at approach: 1    Additional Comments  Teeth as pre-op

## 2017-03-30 ENCOUNTER — OFFICE VISIT (OUTPATIENT)
Dept: SURGERY | Facility: CLINIC | Age: 37
End: 2017-03-30

## 2017-03-30 VITALS
HEART RATE: 103 BPM | SYSTOLIC BLOOD PRESSURE: 116 MMHG | HEIGHT: 66 IN | RESPIRATION RATE: 16 BRPM | WEIGHT: 220 LBS | BODY MASS INDEX: 35.36 KG/M2 | OXYGEN SATURATION: 98 % | DIASTOLIC BLOOD PRESSURE: 78 MMHG | TEMPERATURE: 98.1 F

## 2017-03-30 DIAGNOSIS — Z09 FOLLOW UP: Primary | ICD-10-CM

## 2017-03-30 PROCEDURE — 99024 POSTOP FOLLOW-UP VISIT: CPT | Performed by: SURGERY

## 2017-03-30 NOTE — PROGRESS NOTES
SABRINA Mcarthur MD  Postop Clinic Visit/ H&P    Margoth Weller        1980 03/30/2017  Faviola Ramírez MD  Postoperative day:  Chief Complaint   Patient presents with   • Post-op Follow-up     3 days after lap luisito      Patient had surgery 03/27/2017. She is being selective about her diet and she denies any N/V/D. Her pain is well controled.     Patient reports:  []   Fever  [x]   Urination adequate  []   Eating normally  [x]   Pain control satisfactory  [x]   Defecating normally    No Known Allergies      Review of Systems   Constitutional: Positive for appetite change.   HENT: Negative.    Eyes: Negative.    Respiratory: Negative.    Cardiovascular: Negative.    Gastrointestinal: Negative.    Endocrine: Negative.    Genitourinary: Negative.    Musculoskeletal: Negative.    Skin: Negative.    Allergic/Immunologic: Negative.    Neurological: Negative.    Hematological: Negative.    Psychiatric/Behavioral: Negative.         Nothing new and no changes  Vitals:    03/30/17 1206   BP: 116/78   Pulse: 103   Resp: 16   Temp: 98.1 °F (36.7 °C)   SpO2: 98%     Physical Exam   Patient alert and oriented and moving well.  Abdomen is soft and flat.  No significant tenderness.  Wounds look good.  Instructed in care and activities and when she couldn't drive.  Procedures     Assessment/Diagnosis    Wound progressing normally    Plan:  Continue activity restrictions and check her back next week.    15 minutes was spent face-to-face with patient counseling and discussing procedures     VLADIMIR Mcarthur MD

## 2017-03-31 LAB
LAB AP CASE REPORT: NORMAL
Lab: NORMAL
PATH REPORT.FINAL DX SPEC: NORMAL

## 2017-04-04 ENCOUNTER — TELEPHONE (OUTPATIENT)
Dept: INTERNAL MEDICINE | Facility: CLINIC | Age: 37
End: 2017-04-04

## 2017-04-04 RX ORDER — OSELTAMIVIR PHOSPHATE 75 MG/1
75 CAPSULE ORAL DAILY
Qty: 10 CAPSULE | Refills: 0 | Status: SHIPPED | OUTPATIENT
Start: 2017-04-04 | End: 2017-04-05

## 2017-04-05 ENCOUNTER — OFFICE VISIT (OUTPATIENT)
Dept: SURGERY | Facility: CLINIC | Age: 37
End: 2017-04-05

## 2017-04-05 VITALS
RESPIRATION RATE: 16 BRPM | SYSTOLIC BLOOD PRESSURE: 118 MMHG | HEIGHT: 66 IN | TEMPERATURE: 98.5 F | WEIGHT: 220 LBS | BODY MASS INDEX: 35.36 KG/M2 | DIASTOLIC BLOOD PRESSURE: 78 MMHG

## 2017-04-05 DIAGNOSIS — Z09 FOLLOW UP: Primary | ICD-10-CM

## 2017-04-05 DIAGNOSIS — K80.20 CALCULUS OF GALLBLADDER WITHOUT CHOLECYSTITIS WITHOUT OBSTRUCTION: ICD-10-CM

## 2017-04-05 PROCEDURE — 99024 POSTOP FOLLOW-UP VISIT: CPT | Performed by: SURGERY

## 2017-04-05 NOTE — PROGRESS NOTES
SABRINA Mcarthur MD  Postop Clinic Visit/ H&P    Margoth Weller        1980 04/05/2017  Faviola Ramírez MD  Postoperative day:  Chief Complaint   Patient presents with   • Post-op Follow-up     lap luisito     Patient has been having constipation after her surgery.  Otherwise soreness gone and activities resumed..    Patient reports:  []   Fever  [x]   Urination adequate  [x]   Eating normally  [x]   Pain control satisfactory  [x]   Defecating normally    No Known Allergies      Review of Systems   Constitutional: Negative.    HENT: Negative.    Eyes: Negative.    Respiratory: Negative.    Cardiovascular: Negative.    Gastrointestinal: Positive for constipation.   Endocrine: Negative.    Genitourinary: Negative.    Musculoskeletal: Negative.    Skin: Negative.    Allergic/Immunologic: Negative.    Neurological: Negative.    Hematological: Negative.    Psychiatric/Behavioral: Negative.         Nothing new and no changes  Vitals:    04/05/17 1423   BP: 118/78   Resp: 16   Temp: 98.5 °F (36.9 °C)     Physical Exam   Patient moving very well and seems to have no significant soreness.  Her abdomen is soft and her wounds look good.  Procedures     Assessment/Diagnosis    Normal postoperative course    Plan:  Patient can resume activities.  Explained what to expect and to return as needed.    15 minutes was spent face-to-face with patient counseling and discussing procedures     VLADIMIR Mcarthur MD

## 2017-04-18 ENCOUNTER — TELEPHONE (OUTPATIENT)
Dept: SURGERY | Facility: CLINIC | Age: 37
End: 2017-04-18

## 2017-04-18 NOTE — TELEPHONE ENCOUNTER
I left a message for the patient to call back. Per Dr Mcarthur, she may have pulled a stitch and she can make an appointment to see him in the office.

## 2017-04-18 NOTE — TELEPHONE ENCOUNTER
Patient had gallbladder surgery on the 27 of March and has been feeling well. She was in bed last night and turned and heard something pop. Today she has been really sore between the 2 incision and is having sharp pains that come and go without lasting long. She is wanting to know if she should be worried.

## 2017-04-19 ENCOUNTER — OFFICE VISIT (OUTPATIENT)
Dept: SURGERY | Facility: CLINIC | Age: 37
End: 2017-04-19

## 2017-04-19 VITALS
RESPIRATION RATE: 16 BRPM | TEMPERATURE: 98 F | HEIGHT: 66 IN | DIASTOLIC BLOOD PRESSURE: 70 MMHG | HEART RATE: 89 BPM | SYSTOLIC BLOOD PRESSURE: 110 MMHG | OXYGEN SATURATION: 98 % | WEIGHT: 220 LBS | BODY MASS INDEX: 35.36 KG/M2

## 2017-04-19 DIAGNOSIS — R10.11 RIGHT UPPER QUADRANT ABDOMINAL PAIN: Primary | ICD-10-CM

## 2017-04-19 PROCEDURE — 99024 POSTOP FOLLOW-UP VISIT: CPT | Performed by: SURGERY

## 2017-04-19 NOTE — PROGRESS NOTES
SABRINA Mcarthur MD  Postop Clinic Visit/ H&P    Margoth Weller        1980 04/19/2017  Faviola Ramírez MD  Postoperative day:  Chief Complaint   Patient presents with   • Post-op Follow-up     lap luisito      Patient had lap luisito 03/27/2017. She is c/o soreness and shooting pain after turning over in the bed and hearing something pop on Monday night.  A lot better today.  When I tried to find a sore spot she described I could not find one.  She searched and could not find it either now.    Patient reports:  []   Fever  [x]   Urination adequate  [x]   Eating normally  [x]   Pain control satisfactory  [x]   Defecating normally    No Known Allergies      Review of Systems   Constitutional: Negative.    HENT: Negative.    Eyes: Negative.    Respiratory: Negative.    Cardiovascular: Negative.    Gastrointestinal: Positive for abdominal pain.        Soreness     Endocrine: Negative.    Genitourinary: Negative.    Musculoskeletal: Negative.    Skin: Negative.    Allergic/Immunologic: Negative.    Neurological: Negative.    Hematological: Negative.    Psychiatric/Behavioral: Negative.         Nothing new and no changes  Vitals:    04/19/17 1420   BP: 110/70   Pulse: 89   Resp: 16   Temp: 98 °F (36.7 °C)   SpO2: 98%     Physical Exam   Her upper abdomen and operative area was not tender.  Could not find any bruising or swelling like she may have torn no muscle or pulled a stitch loose.    Procedures     Assessment/Diagnosis  Wound progressing normally    Plan: She will continue to watch that area and return if she has a recurrence.  Patient can resume activities.  Explained what to expect and to return as needed.    15 minutes was spent face-to-face with patient counseling and discussing procedures     VLADIMIR Mcarthur MD

## 2017-04-28 ENCOUNTER — TELEPHONE (OUTPATIENT)
Dept: SURGERY | Facility: CLINIC | Age: 37
End: 2017-04-28

## 2017-04-28 NOTE — TELEPHONE ENCOUNTER
Patient is c/o of right side swelling after her recent gallbladder surgery with Dr Mcarthur. She has not other problems.   Per Dr Crump, who is covering for Dr Mcarthur, she was told by voicemail that this was normal because the muscle were still healing after her surgery. The should go back to normal over time.

## 2017-08-10 ENCOUNTER — TELEPHONE (OUTPATIENT)
Dept: INTERNAL MEDICINE | Facility: CLINIC | Age: 37
End: 2017-08-10

## 2017-08-10 NOTE — TELEPHONE ENCOUNTER
Patient will be in tomorrow for a physical. She says that she needs a TB Assessment for work. Not the skin test or lab draw, but just a questionnaire/survey. Is that something that Dr. CASTELLANOS can do? Please give the patient a call.

## 2017-08-11 ENCOUNTER — OFFICE VISIT (OUTPATIENT)
Dept: INTERNAL MEDICINE | Facility: CLINIC | Age: 37
End: 2017-08-11

## 2017-08-11 VITALS
SYSTOLIC BLOOD PRESSURE: 124 MMHG | OXYGEN SATURATION: 98 % | HEART RATE: 86 BPM | WEIGHT: 234 LBS | RESPIRATION RATE: 12 BRPM | BODY MASS INDEX: 37.61 KG/M2 | DIASTOLIC BLOOD PRESSURE: 80 MMHG | HEIGHT: 66 IN

## 2017-08-11 DIAGNOSIS — Z00.00 HEALTHCARE MAINTENANCE: ICD-10-CM

## 2017-08-11 DIAGNOSIS — F32.81 PREMENSTRUAL DYSPHORIC DISORDER: Primary | ICD-10-CM

## 2017-08-11 DIAGNOSIS — Z23 NEED FOR TDAP VACCINATION: ICD-10-CM

## 2017-08-11 PROBLEM — M72.2 PLANTAR FASCIITIS: Status: ACTIVE | Noted: 2017-08-11

## 2017-08-11 PROBLEM — M53.3 SACROILIAC DYSFUNCTION: Status: ACTIVE | Noted: 2017-08-11

## 2017-08-11 PROBLEM — G43.009 MIGRAINE WITHOUT AURA AND WITHOUT STATUS MIGRAINOSUS, NOT INTRACTABLE: Status: ACTIVE | Noted: 2017-08-11

## 2017-08-11 PROBLEM — G43.909 HEADACHE, MIGRAINE: Status: ACTIVE | Noted: 2017-08-11

## 2017-08-11 PROBLEM — M76.30 ILIOTIBIAL BAND SYNDROME: Status: ACTIVE | Noted: 2017-08-11

## 2017-08-11 PROCEDURE — 99395 PREV VISIT EST AGE 18-39: CPT | Performed by: FAMILY MEDICINE

## 2017-08-11 RX ORDER — ESCITALOPRAM OXALATE 5 MG/1
5 TABLET ORAL DAILY
Qty: 30 TABLET | Refills: 2 | Status: SHIPPED | OUTPATIENT
Start: 2017-08-11 | End: 2017-10-13 | Stop reason: SDUPTHER

## 2017-08-12 LAB
25(OH)D3+25(OH)D2 SERPL-MCNC: 31.9 NG/ML
ALBUMIN SERPL-MCNC: 4.4 G/DL (ref 3.5–5)
ALBUMIN/GLOB SERPL: 1.6 G/DL (ref 1–2)
ALP SERPL-CCNC: 65 U/L (ref 38–126)
ALT SERPL-CCNC: 26 U/L (ref 13–69)
AST SERPL-CCNC: 22 U/L (ref 15–46)
BILIRUB SERPL-MCNC: 0.5 MG/DL (ref 0.2–1.3)
BUN SERPL-MCNC: 10 MG/DL (ref 7–20)
BUN/CREAT SERPL: 12.5 (ref 7.1–23.5)
CALCIUM SERPL-MCNC: 9.3 MG/DL (ref 8.4–10.2)
CHLORIDE SERPL-SCNC: 103 MMOL/L (ref 98–107)
CO2 SERPL-SCNC: 25 MMOL/L (ref 26–30)
CREAT SERPL-MCNC: 0.8 MG/DL (ref 0.6–1.3)
GLOBULIN SER CALC-MCNC: 2.7 GM/DL
GLUCOSE SERPL-MCNC: 77 MG/DL (ref 74–98)
HBA1C MFR BLD: 4.9 %
POTASSIUM SERPL-SCNC: 4.1 MMOL/L (ref 3.5–5.1)
PROT SERPL-MCNC: 7.1 G/DL (ref 6.3–8.2)
SODIUM SERPL-SCNC: 139 MMOL/L (ref 137–145)
T4 SERPL-MCNC: 7.2 UG/DL (ref 4.5–12)
TSH SERPL DL<=0.005 MIU/L-ACNC: 0.89 MIU/ML (ref 0.47–4.68)

## 2017-09-08 ENCOUNTER — CLINICAL SUPPORT (OUTPATIENT)
Dept: INTERNAL MEDICINE | Facility: CLINIC | Age: 37
End: 2017-09-08

## 2017-09-08 PROCEDURE — 90715 TDAP VACCINE 7 YRS/> IM: CPT | Performed by: FAMILY MEDICINE

## 2017-09-08 PROCEDURE — 90471 IMMUNIZATION ADMIN: CPT | Performed by: FAMILY MEDICINE

## 2017-10-13 ENCOUNTER — OFFICE VISIT (OUTPATIENT)
Dept: INTERNAL MEDICINE | Facility: CLINIC | Age: 37
End: 2017-10-13

## 2017-10-13 VITALS
OXYGEN SATURATION: 97 % | HEIGHT: 66 IN | RESPIRATION RATE: 12 BRPM | HEART RATE: 61 BPM | SYSTOLIC BLOOD PRESSURE: 112 MMHG | WEIGHT: 235 LBS | BODY MASS INDEX: 37.77 KG/M2 | DIASTOLIC BLOOD PRESSURE: 80 MMHG

## 2017-10-13 DIAGNOSIS — F32.81 PREMENSTRUAL DYSPHORIC DISORDER: Primary | ICD-10-CM

## 2017-10-13 DIAGNOSIS — Z23 FLU VACCINE NEED: ICD-10-CM

## 2017-10-13 PROCEDURE — 90686 IIV4 VACC NO PRSV 0.5 ML IM: CPT | Performed by: FAMILY MEDICINE

## 2017-10-13 PROCEDURE — 99213 OFFICE O/P EST LOW 20 MIN: CPT | Performed by: FAMILY MEDICINE

## 2017-10-13 PROCEDURE — 90471 IMMUNIZATION ADMIN: CPT | Performed by: FAMILY MEDICINE

## 2017-10-13 RX ORDER — ESCITALOPRAM OXALATE 10 MG/1
10 TABLET ORAL DAILY
Qty: 30 TABLET | Refills: 5 | Status: SHIPPED | OUTPATIENT
Start: 2017-10-13 | End: 2018-05-22

## 2017-12-11 ENCOUNTER — OFFICE VISIT (OUTPATIENT)
Dept: OBSTETRICS AND GYNECOLOGY | Facility: CLINIC | Age: 37
End: 2017-12-11

## 2017-12-11 VITALS
SYSTOLIC BLOOD PRESSURE: 130 MMHG | DIASTOLIC BLOOD PRESSURE: 60 MMHG | BODY MASS INDEX: 36.8 KG/M2 | HEIGHT: 66 IN | WEIGHT: 229 LBS

## 2017-12-11 DIAGNOSIS — Z12.4 SCREENING FOR MALIGNANT NEOPLASM OF CERVIX: ICD-10-CM

## 2017-12-11 DIAGNOSIS — Z01.419 ENCOUNTER FOR GYNECOLOGICAL EXAMINATION WITHOUT ABNORMAL FINDING: Primary | ICD-10-CM

## 2017-12-11 PROCEDURE — 99395 PREV VISIT EST AGE 18-39: CPT | Performed by: PHYSICIAN ASSISTANT

## 2017-12-11 NOTE — PATIENT INSTRUCTIONS
Self breast exam monthly  Regular exercise  Mammogram every 2 years starting at age 38 then annual mammogram at age 48

## 2018-05-22 ENCOUNTER — OFFICE VISIT (OUTPATIENT)
Dept: INTERNAL MEDICINE | Facility: CLINIC | Age: 38
End: 2018-05-22

## 2018-05-22 VITALS
SYSTOLIC BLOOD PRESSURE: 120 MMHG | HEIGHT: 66 IN | WEIGHT: 241.38 LBS | BODY MASS INDEX: 38.79 KG/M2 | TEMPERATURE: 97.2 F | DIASTOLIC BLOOD PRESSURE: 80 MMHG | OXYGEN SATURATION: 98 % | HEART RATE: 74 BPM

## 2018-05-22 DIAGNOSIS — F32.81 PREMENSTRUAL DYSPHORIC DISORDER: Primary | ICD-10-CM

## 2018-05-22 DIAGNOSIS — D22.9 SUSPICIOUS NEVUS: ICD-10-CM

## 2018-05-22 PROCEDURE — 99214 OFFICE O/P EST MOD 30 MIN: CPT | Performed by: FAMILY MEDICINE

## 2018-05-22 RX ORDER — ESCITALOPRAM OXALATE 5 MG/1
7.5 TABLET ORAL DAILY
Qty: 45 TABLET | Refills: 5 | Status: SHIPPED | OUTPATIENT
Start: 2018-05-22

## 2018-05-22 NOTE — PROGRESS NOTES
SUBJECTIVE: Margoth Weller is a 37 y.o. female seen for a follow up visit;      Subjective   Margoth Weller is a 37 y.o. female who presents for follow up of PMDD.  At her last visit anti-depressant medication was increased to 10.   She complains of the following side effects from the treatment: zombie feeling, restless sleep from increased dose.  Symptoms have been improving since starting treatment.  Depression symptoms still include bui & irritable and decreased focus. Symptoms that have resolved include: depressed mood, anhedonia, psychomotor retardation, fatigue and feelings of worthlessness/guilt.  Patient denies recurrent thoughts of death, suicidal thoughts without plan and suicidal thoughts with specific plan.      Mole: has had a lesion on her right upper arm for about the past year, thinks the brown halo around it is getting bigger and the center of it keeps getting more irritated and coming off.  Scabs and then heals.  Looks like a callous when healed, rough feeling.        The following portions of the patient's history were reviewed and updated as appropriate: She  has a past medical history of Bladder infection; Cholelithiasis; History of blood transfusion; History of Papanicolaou smear of cervix (12/11/2014); History of urinary tract infection; Hypertension; Other abnormal clinical finding; PONV (postoperative nausea and vomiting); and Vertigo.  She has Migraine without aura and without status migrainosus, not intractable; Sacroiliac dysfunction; Plantar fasciitis; Iliotibial band syndrome; and Premenstrual dysphoric disorder on her pertinent problem list.  She  has a past surgical history that includes Tubal ligation; Dilation and curettage, diagnostic / therapeutic; [place] tooth extraction; cholecystectomy with intraoperative cholangiogram (N/A, 3/27/2017); knee arthroscopy cartilage biopsy (Left); Salpingectomy (Right); and laparoscopic lysis of adhesions (2003).  Her family history  "includes Arrhythmia in her paternal grandfather; Breast cancer (age of onset: 45) in her maternal aunt; Cancer in her maternal aunt; Diabetes in her mother; Heart attack in her paternal grandfather and paternal grandmother; Heart disease in her maternal grandmother, paternal grandfather, and paternal grandmother; Heart failure in her maternal grandmother and mother; Hyperlipidemia in her paternal grandfather and paternal grandmother; Hypertension in her paternal grandfather and paternal grandmother; No Known Problems in her brother and brother.  She  reports that she has never smoked. She has never used smokeless tobacco. She reports that she drinks alcohol. She reports that she does not use drugs.  She has a current medication list which includes the following prescription(s): escitalopram..    Review of Systems   Constitutional: Negative.    Respiratory: Negative.    Cardiovascular: Negative.    Psychiatric/Behavioral: Positive for dysphoric mood. Negative for sleep disturbance.         OBJECTIVE:  /80   Pulse 74   Temp 97.2 °F (36.2 °C)   Ht 167.6 cm (66\")   Wt 109 kg (241 lb 6 oz)   SpO2 98%   BMI 38.96 kg/m²      Physical Exam   Constitutional: She appears well-developed and well-nourished. No distress.   Skin:                ASSESSMENT:   Diagnosis Plan   1. Premenstrual dysphoric disorder  escitalopram (LEXAPRO) 5 MG tablet   2. Suspicious nevus         Will increase lexapro to 7.5 mg dose      Return in about 1 day (around 5/23/2018) for 8:30 AM for skin punch biopsy.                "

## 2018-05-23 ENCOUNTER — PROCEDURE VISIT (OUTPATIENT)
Dept: INTERNAL MEDICINE | Facility: CLINIC | Age: 38
End: 2018-05-23

## 2018-05-23 VITALS
OXYGEN SATURATION: 98 % | BODY MASS INDEX: 38.73 KG/M2 | WEIGHT: 241 LBS | TEMPERATURE: 97.8 F | DIASTOLIC BLOOD PRESSURE: 80 MMHG | SYSTOLIC BLOOD PRESSURE: 110 MMHG | HEART RATE: 71 BPM | HEIGHT: 66 IN

## 2018-05-23 DIAGNOSIS — D22.9 SUSPICIOUS NEVUS: Primary | ICD-10-CM

## 2018-05-23 PROCEDURE — 11100 PR BIOPSY OF SKIN LESION: CPT | Performed by: FAMILY MEDICINE

## 2018-05-30 ENCOUNTER — PATIENT MESSAGE (OUTPATIENT)
Dept: INTERNAL MEDICINE | Facility: CLINIC | Age: 38
End: 2018-05-30

## 2018-05-31 NOTE — TELEPHONE ENCOUNTER
Marlen Mooney MA 5/31/2018 9:41 AM EDT        ----- Message -----  From: Margoth Weller  Sent: 5/30/2018 6:39 PM  To: Seb Brown  [facility] [place]  Subject: Test Results Question     I will be headed out on vacation tomorrow Thursday May 31st at noon and won't be back until Monday. I just wanted to check in to see if the results of the biopsy on my arm had come in. Also it seems to be healing a bit odd there is a little bump that has come up. The white skin part that is above the scab is what is kind of raised. I wanted to make sure that that was not abnormal.    Also when is your last day?    Thank you,  Margoth Weller  [phone]

## 2018-06-18 ENCOUNTER — PATIENT MESSAGE (OUTPATIENT)
Dept: INTERNAL MEDICINE | Facility: CLINIC | Age: 38
End: 2018-06-18

## 2018-06-29 ENCOUNTER — PATIENT MESSAGE (OUTPATIENT)
Dept: INTERNAL MEDICINE | Facility: CLINIC | Age: 38
End: 2018-06-29

## 2018-07-15 ENCOUNTER — PATIENT MESSAGE (OUTPATIENT)
Dept: INTERNAL MEDICINE | Facility: CLINIC | Age: 38
End: 2018-07-15

## 2018-07-16 ENCOUNTER — OFFICE VISIT (OUTPATIENT)
Dept: INTERNAL MEDICINE | Facility: CLINIC | Age: 38
End: 2018-07-16

## 2018-07-16 VITALS
WEIGHT: 246 LBS | HEIGHT: 66 IN | SYSTOLIC BLOOD PRESSURE: 118 MMHG | OXYGEN SATURATION: 97 % | DIASTOLIC BLOOD PRESSURE: 80 MMHG | TEMPERATURE: 98.3 F | HEART RATE: 76 BPM | BODY MASS INDEX: 39.53 KG/M2

## 2018-07-16 DIAGNOSIS — N92.0 MENORRHAGIA WITH REGULAR CYCLE: Primary | ICD-10-CM

## 2018-07-16 PROCEDURE — 99213 OFFICE O/P EST LOW 20 MIN: CPT | Performed by: FAMILY MEDICINE

## 2018-07-16 NOTE — PROGRESS NOTES
SUBJECTIVE: Margoth Weller is a 37 y.o. female seen for a follow up visit;    Missing tampon: she is concerned that she has a missing tampon.  She doesn't remember taking it out but hasn't been able to feel strings or tampon while trying.  Denies any pain, foul smelling discharge or urinary changes.         The following portions of the patient's history were reviewed and updated as appropriate: She  has a past medical history of Bladder infection; Cholelithiasis; History of blood transfusion; History of Papanicolaou smear of cervix (12/11/2014); History of urinary tract infection; Hypertension; Other abnormal clinical finding; PONV (postoperative nausea and vomiting); and Vertigo.  She has Migraine without aura and without status migrainosus, not intractable; Sacroiliac dysfunction; Plantar fasciitis; Iliotibial band syndrome; and Premenstrual dysphoric disorder on her pertinent problem list.  She  has a past surgical history that includes Tubal ligation; Dilation and curettage, diagnostic / therapeutic; [place] tooth extraction; cholecystectomy with intraoperative cholangiogram (N/A, 3/27/2017); knee arthroscopy cartilage biopsy (Left); Salpingectomy (Right); and laparoscopic lysis of adhesions (2003).  Her family history includes Arrhythmia in her paternal grandfather; Breast cancer (age of onset: 45) in her maternal aunt; Cancer in her maternal aunt; Diabetes in her mother; Heart attack in her paternal grandfather and paternal grandmother; Heart disease in her maternal grandmother, paternal grandfather, and paternal grandmother; Heart failure in her maternal grandmother and mother; Hyperlipidemia in her paternal grandfather and paternal grandmother; Hypertension in her paternal grandfather and paternal grandmother; No Known Problems in her brother and brother.  She  reports that she has never smoked. She has never used smokeless tobacco. She reports that she drinks alcohol. She reports that she does not use  "drugs.  She has a current medication list which includes the following prescription(s): escitalopram..    Review of Systems   Constitutional: Negative.    Respiratory: Negative.    Cardiovascular: Negative.    Genitourinary: Positive for menstrual problem.         OBJECTIVE:  /80   Pulse 76   Temp 98.3 °F (36.8 °C)   Ht 167.6 cm (66\")   Wt 112 kg (246 lb)   SpO2 97%   BMI 39.71 kg/m²      Physical Exam   Constitutional: She appears well-developed and well-nourished. No distress.   Genitourinary: No foreign body in the vagina.   Genitourinary Comments: Speculum exam done - no retained tampon or other foreign body found           ASSESSMENT:   Diagnosis Plan   1. Menorrhagia with regular cycle         Advised pt to f/u if any other issues with period arise.                      "

## 2018-07-16 NOTE — TELEPHONE ENCOUNTER
Marlen Mooney CMA 7/16/2018 9:29 AM EDT        ----- Message -----  From: September Jordan Weller  Sent: 7/15/2018 10:10 AM  To: Seb Brown [facility]  Subject: Non-Urgent Medical Question     Well it may be urgent but that wasn't an option for my subject. This is a little embarrassing but the web tells me it is actually common. I think I have a list tampon. I went to change and couldn't find string or tampon. I have checked trash can, can't account for it, and can't locate internally. Do I need to make an appt?

## 2019-04-01 ENCOUNTER — TRANSCRIBE ORDERS (OUTPATIENT)
Dept: ADMINISTRATIVE | Facility: HOSPITAL | Age: 39
End: 2019-04-01

## 2019-04-01 DIAGNOSIS — Z12.31 ENCOUNTER FOR SCREENING MAMMOGRAM FOR MALIGNANT NEOPLASM OF BREAST: Primary | ICD-10-CM

## 2019-05-21 ENCOUNTER — HOSPITAL ENCOUNTER (OUTPATIENT)
Dept: MAMMOGRAPHY | Facility: HOSPITAL | Age: 39
Discharge: HOME | End: 2019-05-21
Admitting: FAMILY MEDICINE

## 2019-05-21 DIAGNOSIS — Z12.31 ENCOUNTER FOR SCREENING MAMMOGRAM FOR MALIGNANT NEOPLASM OF BREAST: ICD-10-CM

## 2019-05-21 PROCEDURE — 77067 SCR MAMMO BI INCL CAD: CPT

## 2019-05-21 PROCEDURE — 77063 BREAST TOMOSYNTHESIS BI: CPT

## 2021-08-23 NOTE — PROGRESS NOTES
"Subjective   Chief Complaint   Patient presents with   • Gynecologic Exam     Last pap 2017 WNL.       Margoth Weller is a 37 y.o. year old  presenting to be seen for her annual gynecological exam.   Has no complaints  Cycles regular q month with 5-7 day flow  Has never had mammogram  No family history of breast cancer    Past Medical History:   Diagnosis Date   • Bladder infection    • Cholelithiasis    • History of blood transfusion    • History of Papanicolaou smear of cervix 2014    WNL   • History of urinary tract infection    • Hypertension    • Other abnormal clinical finding     STATES EASY TO BE \"PUT UNDER\" WITH ANESTHESIA.  STATES HAS HAD TO HAVE \"A SHOT TWICE\" TO HELP WAKE BACK UP FROM SURGERY.     • PONV (postoperative nausea and vomiting)     NAUSEA   • Vertigo         Current Outpatient Prescriptions:   •  escitalopram (LEXAPRO) 10 MG tablet, Take 1 tablet by mouth Daily., Disp: 30 tablet, Rfl: 5   No Known Allergies   Past Surgical History:   Procedure Laterality Date   • CHOLECYSTECTOMY WITH INTRAOPERATIVE CHOLANGIOGRAM N/A 3/27/2017    Procedure: CHOLECYSTECTOMY LAPAROSCOPIC ;  Surgeon: Aleksandr Mcarthur MD;  Location: [facility];  Service:    • DILATION AND CURETTAGE, DIAGNOSTIC / THERAPEUTIC     • KNEE ARTHROSCOPY CARTILAGE BIOPSY Left        • LAPAROSCOPIC LYSIS OF ADHESIONS     • SALPINGECTOMY Right     w/ D&C   • TUBAL ABDOMINAL LIGATION         • WISDOM TOOTH EXTRACTION        Social History     Social History   • Marital status:      Spouse name: N/A   • Number of children: N/A   • Years of education: N/A     Occupational History   • Not on file.     Social History Main Topics   • Smoking status: Never Smoker   • Smokeless tobacco: Never Used   • Alcohol use Yes      Comment: rare 2/year   • Drug use: No   • Sexual activity: Yes     Partners: Male     Birth control/ protection: Surgical     Other Topics Concern   • Not on file     Social History " "Narrative      Family History   Problem Relation Age of Onset   • Diabetes Mother      prediabetes   • Heart failure Mother      Takatsubo's cardiomyopathy-resolved   • Heart failure Maternal Grandmother    • Heart disease Maternal Grandmother    • Heart attack Paternal Grandmother    • Hypertension Paternal Grandmother    • Hyperlipidemia Paternal Grandmother    • Heart disease Paternal Grandmother    • No Known Problems Brother    • Cancer Maternal Aunt    • Breast cancer Maternal Aunt 45   • Heart disease Paternal Grandfather    • Hyperlipidemia Paternal Grandfather    • Arrhythmia Paternal Grandfather    • Heart attack Paternal Grandfather    • Hypertension Paternal Grandfather    • No Known Problems Brother        Review of Systems   Constitutional: Negative.    Gastrointestinal: Negative.    Genitourinary: Negative.            Objective   /60  Ht 167.6 cm (66\")  Wt 104 kg (229 lb)  LMP 12/02/2017  BMI 36.96 kg/m2    Physical Exam   Constitutional: She appears well-developed and well-nourished. She is cooperative.   Pulmonary/Chest: Right breast exhibits no inverted nipple, no mass, no nipple discharge, no skin change and no tenderness. Left breast exhibits no inverted nipple, no mass, no nipple discharge, no skin change and no tenderness.   Abdominal: Soft. Normal appearance. There is no hepatosplenomegaly. There is no tenderness.   Genitourinary: Vagina normal and uterus normal. There is no tenderness or lesion on the right labia. There is no tenderness or lesion on the left labia. Cervix exhibits no motion tenderness and no discharge. Right adnexum displays no mass and no tenderness. Left adnexum displays no mass and no tenderness.   Neurological: She is alert.   Skin: Skin is warm and dry.   Psychiatric: She has a normal mood and affect. Her behavior is normal.            Assessment and Plan  September was seen today for gynecologic exam.    Diagnoses and all orders for this visit:    Encounter " for gynecological examination without abnormal finding    Screening for malignant neoplasm of cervix  -     Liquid-based Pap Smear, Screening - ThinPrep Vial, Cervix    Patient Instructions   Self breast exam monthly  Regular exercise  Mammogram every 2 years starting at age 40 then annual mammogram at age 48              This note was electronically signed.    Aliya Haney PA-C   December 11, 2017   regular

## 2021-12-28 ENCOUNTER — TRANSCRIBE ORDERS (OUTPATIENT)
Dept: ADMINISTRATIVE | Facility: HOSPITAL | Age: 41
End: 2021-12-28

## 2021-12-28 DIAGNOSIS — Z12.31 VISIT FOR SCREENING MAMMOGRAM: Primary | ICD-10-CM

## 2022-08-17 ENCOUNTER — HOSPITAL ENCOUNTER (OUTPATIENT)
Dept: MAMMOGRAPHY | Facility: HOSPITAL | Age: 42
Discharge: HOME | End: 2022-08-17
Admitting: FAMILY MEDICINE

## 2022-08-17 DIAGNOSIS — Z12.31 VISIT FOR SCREENING MAMMOGRAM: ICD-10-CM

## 2022-08-17 PROCEDURE — 77063 BREAST TOMOSYNTHESIS BI: CPT

## 2022-08-17 PROCEDURE — 77067 SCR MAMMO BI INCL CAD: CPT

## 2023-02-19 NOTE — PROGRESS NOTES
Shave Biopsy Procedure Note    Pre-operative Diagnosis: Suspicious lesion    Post-operative Diagnosis: same    Locations:right upper arm    Indications: change in size, irritation    Anesthesia: Lidocaine 1% with epinephrine    Procedure Details     Patient informed of the risks (including bleeding and infection) and benefits of the procedure and written informed consent obtained.    After informed written consent was obtained, using Betadine for cleansing and 1% Lidocaine with epinephrine for anesthetic, with sterile technique a 3 mm punch biopsy was used to obtain a biopsy specimen of the lesion. Hemostasis was obtained by pressure and wound was sutured with 5-0 monocryl subcutaneous. Antibiotic dressing is applied, and wound care instructions provided. Be alert for any signs of cutaneous infection. The specimen is labeled and sent to pathology for evaluation. The procedure was well tolerated without complications.    EBL: 5 ml    Findings:      Condition:  Stable    Complications:  none.    Plan:  1. Instructed to keep the wound dry and covered for 24-48h and clean thereafter.  2. Warning signs of infection were reviewed.    3. Recommended that the patient use OTC acetaminophen as needed for pain.        no

## 2023-09-19 ENCOUNTER — TRANSCRIBE ORDERS (OUTPATIENT)
Dept: ADMINISTRATIVE | Facility: HOSPITAL | Age: 43
End: 2023-09-19
Payer: COMMERCIAL

## 2023-09-19 DIAGNOSIS — Z12.31 SCREENING MAMMOGRAM FOR BREAST CANCER: Primary | ICD-10-CM

## 2023-09-26 ENCOUNTER — HOSPITAL ENCOUNTER (EMERGENCY)
Facility: HOSPITAL | Age: 43
Discharge: HOME | End: 2023-09-26
Attending: EMERGENCY MEDICINE | Admitting: EMERGENCY MEDICINE
Payer: COMMERCIAL

## 2023-09-26 VITALS
HEART RATE: 66 BPM | TEMPERATURE: 97.8 F | SYSTOLIC BLOOD PRESSURE: 124 MMHG | WEIGHT: 210 LBS | HEIGHT: 66 IN | OXYGEN SATURATION: 99 % | BODY MASS INDEX: 33.75 KG/M2 | DIASTOLIC BLOOD PRESSURE: 83 MMHG | RESPIRATION RATE: 16 BRPM

## 2023-09-26 DIAGNOSIS — L08.9 TOE INFECTION: Primary | ICD-10-CM

## 2023-09-26 PROCEDURE — 99283 EMERGENCY DEPT VISIT LOW MDM: CPT

## 2023-09-26 RX ORDER — HYDROCODONE BITARTRATE AND ACETAMINOPHEN 5; 325 MG/1; MG/1
1 TABLET ORAL ONCE
Status: COMPLETED | OUTPATIENT
Start: 2023-09-26 | End: 2023-09-26

## 2023-09-26 RX ORDER — HYDROCODONE BITARTRATE AND ACETAMINOPHEN 5; 325 MG/1; MG/1
1 TABLET ORAL EVERY 6 HOURS PRN
Qty: 6 TABLET | Refills: 0 | Status: SHIPPED | OUTPATIENT
Start: 2023-09-26

## 2023-09-26 RX ORDER — CEPHALEXIN 250 MG/1
500 CAPSULE ORAL ONCE
Status: COMPLETED | OUTPATIENT
Start: 2023-09-26 | End: 2023-09-26

## 2023-09-26 RX ORDER — CEPHALEXIN 500 MG/1
500 CAPSULE ORAL 4 TIMES DAILY
Qty: 28 CAPSULE | Refills: 0 | Status: SHIPPED | OUTPATIENT
Start: 2023-09-26 | End: 2023-10-03

## 2023-09-26 RX ADMIN — HYDROCODONE BITARTRATE AND ACETAMINOPHEN 1 TABLET: 5; 325 TABLET ORAL at 02:34

## 2023-09-26 RX ADMIN — CEPHALEXIN 500 MG: 250 CAPSULE ORAL at 02:34

## 2023-09-26 NOTE — ED PROVIDER NOTES
"TRIAGE CHIEF COMPLAINT:     Nursing and triage notes reviewed    Chief Complaint   Patient presents with    Toe Pain      HPI: Margoth Weller is a 43 y.o. female who presents to the emergency department complaining of right great toe pain.  Patient states that she tried to cut an ingrown toenail out on Friday, Tere days previously.  She states that she has had some redness and swelling since that time with increased pain.  She is used topical lidocaine but is still having discomfort.    REVIEW OF SYSTEMS: All other systems reviewed and are negative     PAST MEDICAL HISTORY:   Past Medical History:   Diagnosis Date    Bladder infection     Cholelithiasis     History of blood transfusion     History of Papanicolaou smear of cervix 12/11/2014    WNL    History of urinary tract infection     Hypertension     Other abnormal clinical finding     STATES EASY TO BE \"PUT UNDER\" WITH ANESTHESIA.  STATES HAS HAD TO HAVE \"A SHOT TWICE\" TO HELP WAKE BACK UP FROM SURGERY.      PONV (postoperative nausea and vomiting)     NAUSEA    Vertigo         FAMILY HISTORY:   Family History   Problem Relation Age of Onset    Diabetes Mother         prediabetes    Heart failure Mother         Takatsubo's cardiomyopathy-resolved    Breast cancer Mother     Heart failure Maternal Grandmother     Heart disease Maternal Grandmother     Heart attack Paternal Grandmother     Hypertension Paternal Grandmother     Hyperlipidemia Paternal Grandmother     Heart disease Paternal Grandmother     No Known Problems Brother     Cancer Maternal Aunt     Breast cancer Maternal Aunt 45    Heart disease Paternal Grandfather     Hyperlipidemia Paternal Grandfather     Arrhythmia Paternal Grandfather     Heart attack Paternal Grandfather     Hypertension Paternal Grandfather     No Known Problems Brother         SOCIAL HISTORY:   Social History     Socioeconomic History    Marital status:    Tobacco Use    Smoking status: Never    Smokeless tobacco: " Never   Substance and Sexual Activity    Alcohol use: Yes     Comment: rare 2/year    Drug use: No    Sexual activity: Yes     Partners: Male     Birth control/protection: Surgical        SURGICAL HISTORY:   Past Surgical History:   Procedure Laterality Date    CHOLECYSTECTOMY WITH INTRAOPERATIVE CHOLANGIOGRAM N/A 3/27/2017    Procedure: CHOLECYSTECTOMY LAPAROSCOPIC ;  Surgeon: Aleksandr Mcarthur MD;  Location: [facility];  Service:     DILATION AND CURETTAGE, DIAGNOSTIC / THERAPEUTIC      KNEE ARTHROSCOPY CARTILAGE BIOPSY Left     2001    LAPAROSCOPIC LYSIS OF ADHESIONS  2003    SALPINGECTOMY Right     w/ D&C    TUBAL ABDOMINAL LIGATION      2002    WISDOM TOOTH EXTRACTION          CURRENT MEDICATIONS:      Medication List        ASK your doctor about these medications      escitalopram 5 MG tablet  Commonly known as: Lexapro  Take 1.5 tablets by mouth Daily.               ALLERGIES: Patient has no known allergies.     PHYSICAL EXAM:   VITAL SIGNS:   Vitals:    09/26/23 0132   BP: 124/83   Pulse: 66   Resp: 16   Temp: 97.8 °F (36.6 °C)   SpO2: 99%      CONSTITUTIONAL: Awake, oriented, appears nontoxic   HENT: Atraumatic, normocephalic, oral mucosa pink and moist, airway patent. Nares patent without drainage. External ears normal.   EYES: Conjunctivae clear   NECK: Trachea midline  CARDIOVASCULAR: Normal heart rate, Normal rhythm, No murmurs, rubs, gallops   PULMONARY/CHEST: Clear to auscultation, no rhonchi, wheezes, or rales. Symmetrical breath sounds.  ABDOMINAL: Nondistended, soft, nontender - no rebound or guarding.  NEUROLOGIC: Nonfocal, moving all four extremities, no gross sensory or motor deficits.   EXTREMITIES: No clubbing, cyanosis, or edema.  There is mild redness and swelling without obvious fluctuant pocket.  I do not appreciate any drainage at this time.  SKIN: Warm, Dry, findings as above    ED COURSE / MEDICAL DECISION MAKING:   Margoth Weller is a 43 y.o. female who presents to the emergency  department for evaluation of right great toe pain.  Patient nondistressed on arrival in emergency department.  Vital signs stable.  Physical exam does reveal some redness around the toe.    Differential diagnosis includes ingrown toenail, cellulitis among other etiologies.    No further testing was ordered for further evaluation of the patient's presentation.    Diagnostic information from other sources: Heart review    Interventions: Keflex, Norco    Narrative: Patient presents with right great toe pain.  His toes reveals some redness.  I suspect patient has developed an infection after she had attempted to remove the toenail herself.  We will start antibiotic therapy.  I am hesitant to remove the nail while active infection is present.  Will refer for outpatient evaluation.  Patient comfortable with this plan.    Re-evaluation: Resting comfortably    Plan for disposition is discharge    DECISION TO DISCHARGE/ADMIT: see ED care timeline     FINAL IMPRESSION:   1 --toe infection  2 --   3 --     Electronically signed by: Fatemeh Norwood MD, 9/26/2023 02:27 Fatemeh Springer MD  09/26/23 0233

## 2023-09-26 NOTE — Clinical Note
[facility] EMERGENCY DEPARTMENT  [address] [address]  Phone: [phone]    Margoth Weller was seen and treated in our emergency department on 9/26/2023.  She may return to work on 09/27/2023.         Thank you for choosing [facility].    Fatemeh Norwood MD

## 2024-01-04 ENCOUNTER — HOSPITAL ENCOUNTER (OUTPATIENT)
Dept: MAMMOGRAPHY | Facility: HOSPITAL | Age: 44
Discharge: HOME | End: 2024-01-04
Admitting: FAMILY MEDICINE
Payer: COMMERCIAL

## 2024-01-04 DIAGNOSIS — Z12.31 SCREENING MAMMOGRAM FOR BREAST CANCER: ICD-10-CM

## 2024-01-04 PROCEDURE — 77063 BREAST TOMOSYNTHESIS BI: CPT

## 2024-01-04 PROCEDURE — 77067 SCR MAMMO BI INCL CAD: CPT

## 2024-09-17 ENCOUNTER — LAB (OUTPATIENT)
Dept: LAB | Facility: HOSPITAL | Age: 44
End: 2024-09-17
Payer: COMMERCIAL

## 2024-09-17 ENCOUNTER — TRANSCRIBE ORDERS (OUTPATIENT)
Dept: LAB | Facility: HOSPITAL | Age: 44
End: 2024-09-17
Payer: COMMERCIAL

## 2024-09-17 DIAGNOSIS — Z00.00 ROUTINE GENERAL MEDICAL EXAMINATION AT A HEALTH CARE FACILITY: ICD-10-CM

## 2024-09-17 DIAGNOSIS — R53.83 TIREDNESS: ICD-10-CM

## 2024-09-17 DIAGNOSIS — E55.9 AVITAMINOSIS D: ICD-10-CM

## 2024-09-17 DIAGNOSIS — Z00.00 ROUTINE GENERAL MEDICAL EXAMINATION AT A HEALTH CARE FACILITY: Primary | ICD-10-CM

## 2024-09-17 LAB
25(OH)D3 SERPL-MCNC: 36 NG/ML (ref 30–100)
ALBUMIN SERPL-MCNC: 4.6 G/DL (ref 3.5–5.2)
ALBUMIN/GLOB SERPL: 1.8 G/DL
ALP SERPL-CCNC: 71 U/L (ref 39–117)
ALT SERPL W P-5'-P-CCNC: 10 U/L (ref 1–33)
ANION GAP SERPL CALCULATED.3IONS-SCNC: 11 MMOL/L (ref 5–15)
AST SERPL-CCNC: 16 U/L (ref 1–32)
BILIRUB SERPL-MCNC: 0.5 MG/DL (ref 0–1.2)
BUN SERPL-MCNC: 14 MG/DL (ref 6–20)
BUN/CREAT SERPL: 15.6 (ref 7–25)
CALCIUM SPEC-SCNC: 9.8 MG/DL (ref 8.6–10.5)
CHLORIDE SERPL-SCNC: 100 MMOL/L (ref 98–107)
CO2 SERPL-SCNC: 26 MMOL/L (ref 22–29)
CREAT SERPL-MCNC: 0.9 MG/DL (ref 0.57–1)
DEPRECATED RDW RBC AUTO: 40.2 FL (ref 37–54)
EGFRCR SERPLBLD CKD-EPI 2021: 80 ML/MIN/1.73
ERYTHROCYTE [DISTWIDTH] IN BLOOD BY AUTOMATED COUNT: 12.9 % (ref 12.3–15.4)
GLOBULIN UR ELPH-MCNC: 2.5 GM/DL
GLUCOSE SERPL-MCNC: 80 MG/DL (ref 65–99)
HCT VFR BLD AUTO: 43.4 % (ref 34–46.6)
HGB BLD-MCNC: 14.6 G/DL (ref 12–15.9)
IRON 24H UR-MRATE: 67 MCG/DL (ref 37–145)
IRON SATN MFR SERPL: 15 % (ref 20–50)
MCH RBC QN AUTO: 29.1 PG (ref 26.6–33)
MCHC RBC AUTO-ENTMCNC: 33.6 G/DL (ref 31.5–35.7)
MCV RBC AUTO: 86.6 FL (ref 79–97)
PLATELET # BLD AUTO: 224 10*3/MM3 (ref 140–450)
PMV BLD AUTO: 11.5 FL (ref 6–12)
POTASSIUM SERPL-SCNC: 3.9 MMOL/L (ref 3.5–5.2)
PROT SERPL-MCNC: 7.1 G/DL (ref 6–8.5)
RBC # BLD AUTO: 5.01 10*6/MM3 (ref 3.77–5.28)
SODIUM SERPL-SCNC: 137 MMOL/L (ref 136–145)
TIBC SERPL-MCNC: 437 MCG/DL (ref 298–536)
TRANSFERRIN SERPL-MCNC: 293 MG/DL (ref 200–360)
VIT B12 BLD-MCNC: 381 PG/ML (ref 211–946)
WBC NRBC COR # BLD AUTO: 7.12 10*3/MM3 (ref 3.4–10.8)

## 2024-09-17 PROCEDURE — 36415 COLL VENOUS BLD VENIPUNCTURE: CPT

## 2024-09-17 PROCEDURE — 84466 ASSAY OF TRANSFERRIN: CPT

## 2024-09-17 PROCEDURE — 80053 COMPREHEN METABOLIC PANEL: CPT

## 2024-09-17 PROCEDURE — 83540 ASSAY OF IRON: CPT

## 2024-09-17 PROCEDURE — 82306 VITAMIN D 25 HYDROXY: CPT

## 2024-09-17 PROCEDURE — 82607 VITAMIN B-12: CPT

## 2024-09-17 PROCEDURE — 85027 COMPLETE CBC AUTOMATED: CPT

## 2024-09-23 ENCOUNTER — TRANSCRIBE ORDERS (OUTPATIENT)
Dept: ADMINISTRATIVE | Facility: HOSPITAL | Age: 44
End: 2024-09-23
Payer: COMMERCIAL

## 2024-09-23 DIAGNOSIS — Z12.31 ENCOUNTER FOR SCREENING MAMMOGRAM FOR BREAST CANCER: Primary | ICD-10-CM

## 2025-05-19 ENCOUNTER — APPOINTMENT (OUTPATIENT)
Dept: ULTRASOUND IMAGING | Facility: HOSPITAL | Age: 45
End: 2025-05-19
Payer: COMMERCIAL

## 2025-05-19 ENCOUNTER — APPOINTMENT (OUTPATIENT)
Dept: CT IMAGING | Facility: HOSPITAL | Age: 45
End: 2025-05-19
Payer: COMMERCIAL

## 2025-05-19 ENCOUNTER — HOSPITAL ENCOUNTER (EMERGENCY)
Facility: HOSPITAL | Age: 45
Discharge: HOME | End: 2025-05-19
Attending: STUDENT IN AN ORGANIZED HEALTH CARE EDUCATION/TRAINING PROGRAM | Admitting: STUDENT IN AN ORGANIZED HEALTH CARE EDUCATION/TRAINING PROGRAM
Payer: COMMERCIAL

## 2025-05-19 VITALS
RESPIRATION RATE: 16 BRPM | TEMPERATURE: 98.2 F | WEIGHT: 213 LBS | HEIGHT: 66 IN | DIASTOLIC BLOOD PRESSURE: 70 MMHG | SYSTOLIC BLOOD PRESSURE: 122 MMHG | HEART RATE: 83 BPM | BODY MASS INDEX: 34.23 KG/M2 | OXYGEN SATURATION: 98 %

## 2025-05-19 DIAGNOSIS — R10.32 LEFT LOWER QUADRANT ABDOMINAL PAIN: Primary | ICD-10-CM

## 2025-05-19 DIAGNOSIS — N70.93 TUBO-OVARIAN ABSCESS: ICD-10-CM

## 2025-05-19 LAB
ALBUMIN SERPL-MCNC: 3.9 G/DL (ref 3.5–5.2)
ALBUMIN/GLOB SERPL: 1.2 G/DL
ALP SERPL-CCNC: 71 U/L (ref 39–117)
ALT SERPL W P-5'-P-CCNC: 14 U/L (ref 1–33)
ANION GAP SERPL CALCULATED.3IONS-SCNC: 11.2 MMOL/L (ref 5–15)
AST SERPL-CCNC: 20 U/L (ref 1–32)
BASOPHILS # BLD AUTO: 0.03 10*3/MM3 (ref 0–0.2)
BASOPHILS NFR BLD AUTO: 0.3 % (ref 0–1.5)
BILIRUB SERPL-MCNC: 0.6 MG/DL (ref 0–1.2)
BUN SERPL-MCNC: 8 MG/DL (ref 6–20)
BUN/CREAT SERPL: 11.4 (ref 7–25)
CALCIUM SPEC-SCNC: 8.8 MG/DL (ref 8.6–10.5)
CHLORIDE SERPL-SCNC: 92 MMOL/L (ref 98–107)
CO2 SERPL-SCNC: 24.8 MMOL/L (ref 22–29)
CREAT SERPL-MCNC: 0.7 MG/DL (ref 0.57–1)
DEPRECATED RDW RBC AUTO: 39.1 FL (ref 37–54)
EGFRCR SERPLBLD CKD-EPI 2021: 108.2 ML/MIN/1.73
EOSINOPHIL # BLD AUTO: 0.03 10*3/MM3 (ref 0–0.4)
EOSINOPHIL NFR BLD AUTO: 0.3 % (ref 0.3–6.2)
ERYTHROCYTE [DISTWIDTH] IN BLOOD BY AUTOMATED COUNT: 13.4 % (ref 12.3–15.4)
GLOBULIN UR ELPH-MCNC: 3.3 GM/DL
GLUCOSE SERPL-MCNC: 108 MG/DL (ref 65–99)
HCG SERPL QL: NEGATIVE
HCT VFR BLD AUTO: 35.3 % (ref 34–46.6)
HGB BLD-MCNC: 12.2 G/DL (ref 12–15.9)
HOLD SPECIMEN: NORMAL
IMM GRANULOCYTES # BLD AUTO: 0.04 10*3/MM3 (ref 0–0.05)
IMM GRANULOCYTES NFR BLD AUTO: 0.3 % (ref 0–0.5)
LIPASE SERPL-CCNC: 25 U/L (ref 13–60)
LYMPHOCYTES # BLD AUTO: 0.83 10*3/MM3 (ref 0.7–3.1)
LYMPHOCYTES NFR BLD AUTO: 7 % (ref 19.6–45.3)
MCH RBC QN AUTO: 27.7 PG (ref 26.6–33)
MCHC RBC AUTO-ENTMCNC: 34.6 G/DL (ref 31.5–35.7)
MCV RBC AUTO: 80 FL (ref 79–97)
MONOCYTES # BLD AUTO: 0.8 10*3/MM3 (ref 0.1–0.9)
MONOCYTES NFR BLD AUTO: 6.7 % (ref 5–12)
NEUTROPHILS NFR BLD AUTO: 10.17 10*3/MM3 (ref 1.7–7)
NEUTROPHILS NFR BLD AUTO: 85.4 % (ref 42.7–76)
NRBC BLD AUTO-RTO: 0 /100 WBC (ref 0–0.2)
PLATELET # BLD AUTO: 233 10*3/MM3 (ref 140–450)
PMV BLD AUTO: 10.7 FL (ref 6–12)
POTASSIUM SERPL-SCNC: 3.4 MMOL/L (ref 3.5–5.2)
PROT SERPL-MCNC: 7.2 G/DL (ref 6–8.5)
RBC # BLD AUTO: 4.41 10*6/MM3 (ref 3.77–5.28)
SODIUM SERPL-SCNC: 128 MMOL/L (ref 136–145)
WBC NRBC COR # BLD AUTO: 11.9 10*3/MM3 (ref 3.4–10.8)
WHOLE BLOOD HOLD COAG: NORMAL
WHOLE BLOOD HOLD SPECIMEN: NORMAL

## 2025-05-19 PROCEDURE — 25010000002 CEFTRIAXONE PER 250 MG: Performed by: STUDENT IN AN ORGANIZED HEALTH CARE EDUCATION/TRAINING PROGRAM

## 2025-05-19 PROCEDURE — 84703 CHORIONIC GONADOTROPIN ASSAY: CPT | Performed by: STUDENT IN AN ORGANIZED HEALTH CARE EDUCATION/TRAINING PROGRAM

## 2025-05-19 PROCEDURE — 25810000003 SODIUM CHLORIDE 0.9 % SOLUTION: Performed by: STUDENT IN AN ORGANIZED HEALTH CARE EDUCATION/TRAINING PROGRAM

## 2025-05-19 PROCEDURE — 25510000001 IOPAMIDOL 61 % SOLUTION: Performed by: STUDENT IN AN ORGANIZED HEALTH CARE EDUCATION/TRAINING PROGRAM

## 2025-05-19 PROCEDURE — 76830 TRANSVAGINAL US NON-OB: CPT

## 2025-05-19 PROCEDURE — 96365 THER/PROPH/DIAG IV INF INIT: CPT

## 2025-05-19 PROCEDURE — 83690 ASSAY OF LIPASE: CPT | Performed by: STUDENT IN AN ORGANIZED HEALTH CARE EDUCATION/TRAINING PROGRAM

## 2025-05-19 PROCEDURE — 99285 EMERGENCY DEPT VISIT HI MDM: CPT | Performed by: STUDENT IN AN ORGANIZED HEALTH CARE EDUCATION/TRAINING PROGRAM

## 2025-05-19 PROCEDURE — 96375 TX/PRO/DX INJ NEW DRUG ADDON: CPT

## 2025-05-19 PROCEDURE — 25010000002 KETOROLAC TROMETHAMINE PER 15 MG: Performed by: STUDENT IN AN ORGANIZED HEALTH CARE EDUCATION/TRAINING PROGRAM

## 2025-05-19 PROCEDURE — 74177 CT ABD & PELVIS W/CONTRAST: CPT

## 2025-05-19 PROCEDURE — 25010000002 ONDANSETRON PER 1 MG: Performed by: STUDENT IN AN ORGANIZED HEALTH CARE EDUCATION/TRAINING PROGRAM

## 2025-05-19 PROCEDURE — 96361 HYDRATE IV INFUSION ADD-ON: CPT

## 2025-05-19 PROCEDURE — 85025 COMPLETE CBC W/AUTO DIFF WBC: CPT | Performed by: STUDENT IN AN ORGANIZED HEALTH CARE EDUCATION/TRAINING PROGRAM

## 2025-05-19 PROCEDURE — 80053 COMPREHEN METABOLIC PANEL: CPT | Performed by: STUDENT IN AN ORGANIZED HEALTH CARE EDUCATION/TRAINING PROGRAM

## 2025-05-19 RX ORDER — IBUPROFEN 800 MG/1
800 TABLET, FILM COATED ORAL EVERY 8 HOURS PRN
Qty: 15 TABLET | Refills: 0 | Status: SHIPPED | OUTPATIENT
Start: 2025-05-19

## 2025-05-19 RX ORDER — ONDANSETRON 2 MG/ML
4 INJECTION INTRAMUSCULAR; INTRAVENOUS ONCE
Status: COMPLETED | OUTPATIENT
Start: 2025-05-19 | End: 2025-05-19

## 2025-05-19 RX ORDER — DOXYCYCLINE 100 MG/1
100 CAPSULE ORAL 2 TIMES DAILY
Qty: 28 CAPSULE | Refills: 0 | Status: SHIPPED | OUTPATIENT
Start: 2025-05-19 | End: 2025-06-02

## 2025-05-19 RX ORDER — HYDROCODONE BITARTRATE AND ACETAMINOPHEN 5; 325 MG/1; MG/1
1 TABLET ORAL EVERY 8 HOURS PRN
Qty: 9 TABLET | Refills: 0 | Status: SHIPPED | OUTPATIENT
Start: 2025-05-19 | End: 2025-05-23 | Stop reason: HOSPADM

## 2025-05-19 RX ORDER — KETOROLAC TROMETHAMINE 30 MG/ML
15 INJECTION, SOLUTION INTRAMUSCULAR; INTRAVENOUS ONCE
Status: COMPLETED | OUTPATIENT
Start: 2025-05-19 | End: 2025-05-19

## 2025-05-19 RX ORDER — CIPROFLOXACIN 500 MG/1
500 TABLET, FILM COATED ORAL 2 TIMES DAILY
Qty: 14 TABLET | Refills: 0 | Status: SHIPPED | OUTPATIENT
Start: 2025-05-19 | End: 2025-05-26

## 2025-05-19 RX ORDER — HYDROCHLOROTHIAZIDE 12.5 MG/1
1 TABLET ORAL DAILY
COMMUNITY
Start: 2025-02-28

## 2025-05-19 RX ORDER — METRONIDAZOLE 500 MG/1
500 TABLET ORAL 2 TIMES DAILY
Qty: 28 TABLET | Refills: 0 | Status: SHIPPED | OUTPATIENT
Start: 2025-05-19 | End: 2025-06-02

## 2025-05-19 RX ORDER — IOPAMIDOL 612 MG/ML
100 INJECTION, SOLUTION INTRAVASCULAR
Status: COMPLETED | OUTPATIENT
Start: 2025-05-19 | End: 2025-05-19

## 2025-05-19 RX ADMIN — ONDANSETRON 4 MG: 2 INJECTION INTRAMUSCULAR; INTRAVENOUS at 11:17

## 2025-05-19 RX ADMIN — KETOROLAC TROMETHAMINE 15 MG: 30 INJECTION, SOLUTION INTRAMUSCULAR; INTRAVENOUS at 11:17

## 2025-05-19 RX ADMIN — SODIUM CHLORIDE 1000 ML: 0.9 INJECTION, SOLUTION INTRAVENOUS at 11:17

## 2025-05-19 RX ADMIN — IOPAMIDOL 100 ML: 612 INJECTION, SOLUTION INTRAVENOUS at 11:38

## 2025-05-19 RX ADMIN — CEFTRIAXONE SODIUM 2000 MG: 2 INJECTION, POWDER, FOR SOLUTION INTRAMUSCULAR; INTRAVENOUS at 15:39

## 2025-05-19 NOTE — DISCHARGE INSTRUCTIONS
Instructions from Dr. Perez:    It was a privilege being your ER physician today.    Please follow-up in clinic as we discussed.    Please return to the ER if you experience any worsening symptoms or for any other concerns.

## 2025-05-19 NOTE — ED PROVIDER NOTES
"     [facility]  Emergency Department Encounter  Emergency Medicine Physician Note       Pt Name: Margoth Weller  MRN: [id number]  Pt :   1980  Room Number:    Date of encounter:  2025  PCP: Faviola Ramírez MD  ED Physician: Tavo Perez DO    HPI:  Margoth Weller is a 44 y.o. female who presents to the ED with chief complaint of abdominal pain.  Started gradually on Tuesday.  Complains of pain in the left lower quadrant without lesion.  Duration constant.  Associated nausea, dysuria, and constipation.  Last removal was on Friday.  No fever, chills, chest pain, shortness of breath, cough, flank pain, hematuria, bloody stools, vaginal bleeding or discharge.    No stated past medical history.    Pertinent surgical history: Cholecystectomy, laparoscopic lysis of adhesions, tubal ligation.    PAST MEDICAL HISTORY  Past Medical History:   Diagnosis Date    Bladder infection     Cholelithiasis     History of blood transfusion     History of Papanicolaou smear of cervix 2014    WNL    History of urinary tract infection     Hypertension     Other abnormal clinical finding     STATES EASY TO BE \"PUT UNDER\" WITH ANESTHESIA.  STATES HAS HAD TO HAVE \"A SHOT TWICE\" TO HELP WAKE BACK UP FROM SURGERY.      PONV (postoperative nausea and vomiting)     NAUSEA    Vertigo      Current Outpatient Medications   Medication Instructions    ciprofloxacin (CIPRO) 500 mg, Oral, 2 Times Daily    doxycycline (MONODOX) 100 mg, Oral, 2 Times Daily    escitalopram (LEXAPRO) 7.5 mg, Oral, Daily    hydroCHLOROthiazide 12.5 MG tablet 1 tablet, Daily    HYDROcodone-acetaminophen (NORCO) 5-325 MG per tablet 1 tablet, Oral, Every 6 Hours PRN    HYDROcodone-acetaminophen (NORCO) 5-325 MG per tablet 1 tablet, Oral, Every 8 Hours PRN    ibuprofen (ADVIL,MOTRIN) 800 mg, Oral, Every 8 Hours PRN    metroNIDAZOLE (FLAGYL) 500 mg, Oral, 2 Times Daily    naloxone (NARCAN) 4 MG/0.1ML nasal spray Call 911. Don't " prime. Spray in 1 nostril for overdose. Repeat in 2-3 minutes in other nostril if no or minimal breathing/responsiveness.      PAST SURGICAL HISTORY  Past Surgical History:   Procedure Laterality Date    CHOLECYSTECTOMY WITH INTRAOPERATIVE CHOLANGIOGRAM N/A 3/27/2017    Procedure: CHOLECYSTECTOMY LAPAROSCOPIC ;  Surgeon: Aleksandr Mcarthur MD;  Location: [facility] OR;  Service:     DILATION AND CURETTAGE, DIAGNOSTIC / THERAPEUTIC      KNEE ARTHROSCOPY CARTILAGE BIOPSY Left     2001    LAPAROSCOPIC LYSIS OF ADHESIONS  2003    SALPINGECTOMY Right     w/ D&C    TUBAL ABDOMINAL LIGATION      2002    WISDOM TOOTH EXTRACTION         FAMILY HISTORY  Family History   Problem Relation Age of Onset    Diabetes Mother         prediabetes    Heart failure Mother         Takatsubo's cardiomyopathy-resolved    Breast cancer Mother     Heart failure Maternal Grandmother     Heart disease Maternal Grandmother     Heart attack Paternal Grandmother     Hypertension Paternal Grandmother     Hyperlipidemia Paternal Grandmother     Heart disease Paternal Grandmother     No Known Problems Brother     Cancer Maternal Aunt     Breast cancer Maternal Aunt 45    Heart disease Paternal Grandfather     Hyperlipidemia Paternal Grandfather     Arrhythmia Paternal Grandfather     Heart attack Paternal Grandfather     Hypertension Paternal Grandfather     No Known Problems Brother        SOCIAL HISTORY  Social History     Socioeconomic History    Marital status:    Tobacco Use    Smoking status: Never    Smokeless tobacco: Never   Substance and Sexual Activity    Alcohol use: Yes     Comment: rare 2/year    Drug use: No    Sexual activity: Yes     Partners: Male     Birth control/protection: Surgical     ALLERGIES  Patient has no known allergies.    REVIEW OF SYSTEMS  All systems reviewed and negative except for those discussed in HPI.     PHYSICAL EXAM  ED Triage Vitals [05/19/25 1051]   Temp Heart Rate Resp BP SpO2   98.2 °F (36.8 °C) 81 16  129/82 99 %      Temp src Heart Rate Source Patient Position BP Location FiO2 (%)   Oral Monitor Sitting Left arm --     I have reviewed the triage vital signs and nursing notes.    General: Obese female.  Alert.  Nontoxic appearance.  No acute distress.  Head: Normocephalic.  Atraumatic.  Eyes: No scleral icterus.  ENT: Moist mucous membranes.  Cardiovascular: Regular rate and rhythm.  No murmurs.  No rubs.  2+ distal pulses bilaterally.  Respiratory: No wheezing. No rales.  No rhonchi.  GI: Abdomen is soft.  Nondistended.  + Tenderness left lower quadrant.  No rebound.  No guarding.  No CVA tenderness.  MSK: Moves all 4 extremities.  Neurologic: Oriented x 3.  No focal deficits.  Skin: No edema. No erythema. No pallor. No cyanosis.  Psych: Normal mood and affect.    LAB RESULTS  Recent Results (from the past 24 hours)   Lipase    Collection Time: 05/19/25 11:13 AM    Specimen: Blood   Result Value Ref Range    Lipase 25 13 - 60 U/L   Comprehensive Metabolic Panel    Collection Time: 05/19/25 11:13 AM    Specimen: Blood   Result Value Ref Range    Glucose 108 (H) 65 - 99 mg/dL    BUN 8 6 - 20 mg/dL    Creatinine 0.70 0.57 - 1.00 mg/dL    Sodium 128 (L) 136 - 145 mmol/L    Potassium 3.4 (L) 3.5 - 5.2 mmol/L    Chloride 92 (L) 98 - 107 mmol/L    CO2 24.8 22.0 - 29.0 mmol/L    Calcium 8.8 8.6 - 10.5 mg/dL    Total Protein 7.2 6.0 - 8.5 g/dL    Albumin 3.9 3.5 - 5.2 g/dL    ALT (SGPT) 14 1 - 33 U/L    AST (SGOT) 20 1 - 32 U/L    Alkaline Phosphatase 71 39 - 117 U/L    Total Bilirubin 0.6 0.0 - 1.2 mg/dL    Globulin 3.3 gm/dL    A/G Ratio 1.2 g/dL    BUN/Creatinine Ratio 11.4 7.0 - 25.0    Anion Gap 11.2 5.0 - 15.0 mmol/L    eGFR 108.2 >60.0 mL/min/1.73   Green Top (Gel)    Collection Time: 05/19/25 11:13 AM   Result Value Ref Range    Extra Tube Hold for add-ons.    Lavender Top    Collection Time: 05/19/25 11:13 AM   Result Value Ref Range    Extra Tube hold for add-on    Light Blue Top    Collection Time: 05/19/25  11:13 AM   Result Value Ref Range    Extra Tube Hold for add-ons.    CBC Auto Differential    Collection Time: 05/19/25 11:13 AM    Specimen: Blood   Result Value Ref Range    WBC 11.90 (H) 3.40 - 10.80 10*3/mm3    RBC 4.41 3.77 - 5.28 10*6/mm3    Hemoglobin 12.2 12.0 - 15.9 g/dL    Hematocrit 35.3 34.0 - 46.6 %    MCV 80.0 79.0 - 97.0 fL    MCH 27.7 26.6 - 33.0 pg    MCHC 34.6 31.5 - 35.7 g/dL    RDW 13.4 12.3 - 15.4 %    RDW-SD 39.1 37.0 - 54.0 fl    MPV 10.7 6.0 - 12.0 fL    Platelets 233 140 - 450 10*3/mm3    Neutrophil % 85.4 (H) 42.7 - 76.0 %    Lymphocyte % 7.0 (L) 19.6 - 45.3 %    Monocyte % 6.7 5.0 - 12.0 %    Eosinophil % 0.3 0.3 - 6.2 %    Basophil % 0.3 0.0 - 1.5 %    Immature Grans % 0.3 0.0 - 0.5 %    Neutrophils, Absolute 10.17 (H) 1.70 - 7.00 10*3/mm3    Lymphocytes, Absolute 0.83 0.70 - 3.10 10*3/mm3    Monocytes, Absolute 0.80 0.10 - 0.90 10*3/mm3    Eosinophils, Absolute 0.03 0.00 - 0.40 10*3/mm3    Basophils, Absolute 0.03 0.00 - 0.20 10*3/mm3    Immature Grans, Absolute 0.04 0.00 - 0.05 10*3/mm3    nRBC 0.0 0.0 - 0.2 /100 WBC   hCG, Serum, Qualitative    Collection Time: 05/19/25 11:13 AM    Specimen: Blood   Result Value Ref Range    HCG Qualitative Negative Negative       RADIOLOGY  US Non-ob Transvaginal  Result Date: 5/19/2025  PROCEDURE: US NON-OB TRANSVAGINAL-  HISTORY: concern for tubo-ovarian abscess  COMPARISON: CT scan performed earlier the same day  PROCEDURE:  Sonographic images of the pelvis were obtained transvaginally and transabdominally.  FINDINGS: The uterus measures 9.76 cm x 6.09 cm x 7.84 cm. There are nabothian cysts in the cervix. The endometrium is 7.4 mm. The right ovary is normal in size with small follicles present. Both ovaries demonstrate appropriate blood flow. As seen on CT earlier the same day, there is a fluid-filled tubular structure in the left adnexa extends lateral from the uterus likely fallopian tube. The left ovary contains a 24 mm hypoechoic cystic  structure. Findings are concerning for tubo-ovarian abscess. There is a small amount of fluid in the cul-de-sac.      Fluid-filled, dilated left fallopian tube with cystic structure in the left ovary. Findings are concerning for tubo-ovarian abscess with pelvic free fluid.      Images were reviewed, interpreted, and dictated by Dr. Yas Moya MD Transcribed by Quyen Reyes PA-C.  This report was signed and finalized on 5/19/2025 3:12 PM by Yas Moya MD.      CT Abdomen Pelvis With Contrast  Result Date: 5/19/2025  PROCEDURE: CT ABDOMEN PELVIS W CONTRAST-  HISTORY: LLQ abdominal pain  COMPARISON: None.  PROCEDURE: The patient was injected with IV contrast. Axial images were obtained from the lung bases to the pubic symphysis by computed tomography. This study was performed with techniques to keep radiation doses as low as reasonably achievable, (ALARA). Individualized dose reduction techniques using automated exposure control or adjustment of mA and/or kV according to the patient size were employed.  FINDINGS:  ABDOMEN: The lung bases are clear. The heart is proper size. The liver is homogenous with no focal abnormality. It is mildly enlarged at 19 cm. There are metallic surgical clips in the right upper quadrant consistent with previous cholecystectomy. The spleen is upper limits of normal in size without focal abnormality. No adrenal mass is present.  The pancreas is unremarkable. The kidneys are unremarkable, without evidence of mass or hydronephrosis. The aorta is proper caliber. No free fluid identified. There are mildly prominent periaortic lymph nodes on the left, consider reactive hyperplasia.  PELVIS: Uterus is midline. There is a small amount of nonspecific free fluid..  The appendix is not identified. The urinary bladder is mostly collapsed. There is an inflammatory process in the left lower quadrant which appears to extend from the left side of the fundus of the uterus as seen on series 3 images  36 through 46. The sigmoid colon is located predominantly anterior and superior to this. There is wall thickening of the posterior wall of the proximal sigmoid colon with infiltration of the fat extending posteriorly. Consider tubo-ovarian abscess with reactive change in the adjacent sigmoid colon. Primary sigmoid diverticulitis is another consideration although no definite diverticula identified. No free air identified.      Significant inflammatory process left lower quadrant, consider left tubo-ovarian abscess with reactive changes in the adjacent sigmoid colon; sigmoid diverticulitis not excluded.  Left periaortic adenopathy, suspect reactive. Recommend follow-up in 8 to 12 weeks to document resolution.  Hepatomegaly, etiology unclear.  CTDI: 9.59 mGy DLP:488.12 mGy.cm  This report was signed and finalized on 5/19/2025 12:52 PM by Yas Moya MD.        PROCEDURES  Procedures    RISK STRATIFICATION    MEDICAL DECISION MAKING  44 y.o. female with past medical history listed above who presents with left lower quadrant abdominal pain.    Vital signs within normal limits.  Pulse ox on 9% on room air.  Afebrile.    Based on clinical presentation and physical exam, differential diagnosis includes, but is not limited to, small bowel obstruction, diverticulitis, colitis.    At least 3 different tests have been ordered on this patient.    Medications administered in ED:  Medications   cefTRIAXone (ROCEPHIN) 2,000 mg in sodium chloride 0.9 % 100 mL IVPB-VTB (2,000 mg Intravenous New Bag 5/19/25 1539)   ketorolac (TORADOL) injection 15 mg (15 mg Intravenous Given 5/19/25 1117)   ondansetron (ZOFRAN) injection 4 mg (4 mg Intravenous Given 5/19/25 1117)   sodium chloride 0.9 % bolus 1,000 mL (0 mL Intravenous Stopped 5/19/25 1432)   iopamidol (ISOVUE-300) 61 % injection 100 mL (100 mL Intravenous Given 5/19/25 1138)     Please see ED course below for my interpretation of the ED workup.  ED Course as of 05/19/25 1546   Mon  May 19, 2025   1544 I reviewed the labs listed above. Clinically unremarkable.    Notable findings are highlighted below.    Old laboratory data was reviewed from the medical records and compared to today's results.   [JS]   1544 WBC(!): 11.90 [JS]   1544 Potassium(!): 3.4 [JS]   1544 CT Abdomen Pelvis With Contrast  I have independently reviewed and interpreted the CT abdomen pelvis.  My interpretation is negative for small bowel obstruction.    Radiologist notes the following: Significant inflammatory process left lower quadrant, consider left tubo-ovarian abscess with reactive changes in the adjacent sigmoid colon; sigmoid diverticulitis not excluded.  Left periaortic adenopathy, suspect reactive. Recommend follow-up in 8 to 12 weeks to document resolution.  Hepatomegaly, etiology unclear.    [JS]   1544 US Non-ob Transvaginal  Radiology reports reviewed.     US Non-ob Transvaginal  Result Date: 5/19/2025  Fluid-filled, dilated left fallopian tube with cystic structure in the left ovary. Findings are concerning for tubo-ovarian abscess with pelvic free fluid.      Images were reviewed, interpreted, and dictated by Dr. Yas Moya MD Transcribed by Quyen Reyes PA-C.  This report was signed and finalized on 5/19/2025 3:12 PM by Yas Moya MD.         [JS]   1544 Case discussed with Dr. Perez (OB/GYN). He reviewed personally reviewed US. Not convinced this is truly TOA. He reports he just sees hydrosalpinx. No acute surgical intervention at this time.  Recommend treatment with IV Rocephin x 1 in the ED and discharge with doxycycline and Flagyl.  Will see patient in clinic on Wednesday for recheck. [JS]   1545 On re-evaluation, patient resting comfortably.  States symptoms have improved following therapy. Repeat abdominal exam performed.  Abdomen remains soft, nondistended, nonsurgical in nature.  Vital signs remained stable on room air.  Patient was ambulatory in the ED with steady gait.  Able to tolerate  oral intake appropriately.    I discussed the findings of the ED workup with the patient at bedside including recommendations from OB/GYN. I recommended outpatient follow-up with PCP/OBGYN.  Patient was deemed medically stable for discharge with close outpatient follow-up and strict ED return precautions. Patient agreeable with plan and disposition.    Home medications were reviewed.  Prescriptions for discharge: Doxycycline, Flagyl, Cipro, Norco, ibuprofen    Of note, I also cover the patient with ciprofloxacin in case there may be a diverticulitis component to this presentation.    Chronic conditions affecting care: None    Social determinants of health impacting treatment or disposition: None     [JS]      ED Course User Index  [JS] Tavo Perez DO     REPEAT VITAL SIGNS  AS OF 15:46 EDT VITALS:  BP - 122/70  HR - 83  TEMP - 98.2 °F (36.8 °C) (Oral)  O2 SATS - 98%    DIAGNOSIS  Final diagnoses:   Left lower quadrant abdominal pain   Tubo-ovarian abscess     DISPOSITION  ED Disposition       ED Disposition   Discharge    Condition   Stable    Comment   --               PATIENT REFERRALS  Edgar Perez MD  [address]  [address]  [facility] [place] [id number] [phone]      OB/GYN. On Wednesday in clinic as discussed.    Faviola Ramírez MD  [address]  [facility] [address]  [facility] [place] [address]  [phone]      PCP. 48 hours.    CHERI reviewed by Tavo Perez DO     Please note that portions of this document were completed with voice recognition software.        Tavo Perez DO  05/19/25 4527

## 2025-05-19 NOTE — Clinical Note
[facility] EMERGENCY DEPARTMENT  [address] [address]  Phone: [phone]    Margoth Weller was seen and treated in our emergency department on 5/19/2025.  She may return to work on 05/22/2025.         Thank you for choosing [facility].    Tavo Perez DO

## 2025-05-21 ENCOUNTER — HOSPITAL ENCOUNTER (INPATIENT)
Facility: HOSPITAL | Age: 45
LOS: 2 days | Discharge: HOME | End: 2025-05-23
Attending: OBSTETRICS & GYNECOLOGY | Admitting: OBSTETRICS & GYNECOLOGY
Payer: COMMERCIAL

## 2025-05-21 ENCOUNTER — PATIENT ROUNDING (BHMG ONLY) (OUTPATIENT)
Dept: OBSTETRICS AND GYNECOLOGY | Facility: CLINIC | Age: 45
End: 2025-05-21
Payer: COMMERCIAL

## 2025-05-21 ENCOUNTER — OFFICE VISIT (OUTPATIENT)
Dept: OBSTETRICS AND GYNECOLOGY | Facility: CLINIC | Age: 45
End: 2025-05-21
Payer: COMMERCIAL

## 2025-05-21 VITALS
HEIGHT: 66 IN | DIASTOLIC BLOOD PRESSURE: 64 MMHG | BODY MASS INDEX: 34.81 KG/M2 | SYSTOLIC BLOOD PRESSURE: 100 MMHG | WEIGHT: 216.6 LBS

## 2025-05-21 DIAGNOSIS — N76.0 ACUTE VAGINITIS: Primary | ICD-10-CM

## 2025-05-21 DIAGNOSIS — N70.93 LEFT TUBO-OVARIAN ABSCESS: ICD-10-CM

## 2025-05-21 LAB
ALBUMIN SERPL-MCNC: 3.6 G/DL (ref 3.5–5.2)
ALBUMIN/GLOB SERPL: 1.1 G/DL
ALP SERPL-CCNC: 82 U/L (ref 39–117)
ALT SERPL W P-5'-P-CCNC: 29 U/L (ref 1–33)
ANION GAP SERPL CALCULATED.3IONS-SCNC: 9.9 MMOL/L (ref 5–15)
AST SERPL-CCNC: 25 U/L (ref 1–32)
BASOPHILS # BLD AUTO: 0.04 10*3/MM3 (ref 0–0.2)
BASOPHILS NFR BLD AUTO: 0.4 % (ref 0–1.5)
BILIRUB SERPL-MCNC: 0.3 MG/DL (ref 0–1.2)
BILIRUB UR QL STRIP: NEGATIVE
BUN SERPL-MCNC: 7 MG/DL (ref 6–20)
BUN/CREAT SERPL: 8.5 (ref 7–25)
CALCIUM SPEC-SCNC: 8.7 MG/DL (ref 8.6–10.5)
CHLORIDE SERPL-SCNC: 99 MMOL/L (ref 98–107)
CLARITY UR: CLEAR
CO2 SERPL-SCNC: 27.1 MMOL/L (ref 22–29)
COLOR UR: YELLOW
CREAT SERPL-MCNC: 0.82 MG/DL (ref 0.57–1)
D-LACTATE SERPL-SCNC: 1.6 MMOL/L (ref 0.5–2)
DEPRECATED RDW RBC AUTO: 39.8 FL (ref 37–54)
EGFRCR SERPLBLD CKD-EPI 2021: 89.5 ML/MIN/1.73
EOSINOPHIL # BLD AUTO: 0.08 10*3/MM3 (ref 0–0.4)
EOSINOPHIL NFR BLD AUTO: 0.8 % (ref 0.3–6.2)
ERYTHROCYTE [DISTWIDTH] IN BLOOD BY AUTOMATED COUNT: 13.5 % (ref 12.3–15.4)
GLOBULIN UR ELPH-MCNC: 3.2 GM/DL
GLUCOSE SERPL-MCNC: 122 MG/DL (ref 65–99)
GLUCOSE UR STRIP-MCNC: NEGATIVE MG/DL
HCT VFR BLD AUTO: 31.8 % (ref 34–46.6)
HGB BLD-MCNC: 10.9 G/DL (ref 12–15.9)
HGB UR QL STRIP.AUTO: NEGATIVE
IMM GRANULOCYTES # BLD AUTO: 0.02 10*3/MM3 (ref 0–0.05)
IMM GRANULOCYTES NFR BLD AUTO: 0.2 % (ref 0–0.5)
KETONES UR QL STRIP: NEGATIVE
LEUKOCYTE ESTERASE UR QL STRIP.AUTO: NEGATIVE
LYMPHOCYTES # BLD AUTO: 1.01 10*3/MM3 (ref 0.7–3.1)
LYMPHOCYTES NFR BLD AUTO: 10.7 % (ref 19.6–45.3)
MCH RBC QN AUTO: 27.9 PG (ref 26.6–33)
MCHC RBC AUTO-ENTMCNC: 34.3 G/DL (ref 31.5–35.7)
MCV RBC AUTO: 81.3 FL (ref 79–97)
MONOCYTES # BLD AUTO: 0.55 10*3/MM3 (ref 0.1–0.9)
MONOCYTES NFR BLD AUTO: 5.8 % (ref 5–12)
NEUTROPHILS NFR BLD AUTO: 7.76 10*3/MM3 (ref 1.7–7)
NEUTROPHILS NFR BLD AUTO: 82.1 % (ref 42.7–76)
NITRITE UR QL STRIP: NEGATIVE
NRBC BLD AUTO-RTO: 0 /100 WBC (ref 0–0.2)
PH UR STRIP.AUTO: 7 [PH] (ref 5–8)
PLATELET # BLD AUTO: 249 10*3/MM3 (ref 140–450)
PMV BLD AUTO: 10.6 FL (ref 6–12)
POTASSIUM SERPL-SCNC: 3.3 MMOL/L (ref 3.5–5.2)
PROT SERPL-MCNC: 6.8 G/DL (ref 6–8.5)
PROT UR QL STRIP: NEGATIVE
RBC # BLD AUTO: 3.91 10*6/MM3 (ref 3.77–5.28)
SODIUM SERPL-SCNC: 136 MMOL/L (ref 136–145)
SP GR UR STRIP: 1.01 (ref 1–1.03)
UROBILINOGEN UR QL STRIP: NORMAL
WBC NRBC COR # BLD AUTO: 9.46 10*3/MM3 (ref 3.4–10.8)

## 2025-05-21 PROCEDURE — 83605 ASSAY OF LACTIC ACID: CPT | Performed by: OBSTETRICS & GYNECOLOGY

## 2025-05-21 PROCEDURE — 25010000002 KETOROLAC TROMETHAMINE PER 15 MG: Performed by: STUDENT IN AN ORGANIZED HEALTH CARE EDUCATION/TRAINING PROGRAM

## 2025-05-21 PROCEDURE — 25010000002 CEFTRIAXONE PER 250 MG: Performed by: STUDENT IN AN ORGANIZED HEALTH CARE EDUCATION/TRAINING PROGRAM

## 2025-05-21 PROCEDURE — 85025 COMPLETE CBC W/AUTO DIFF WBC: CPT | Performed by: OBSTETRICS & GYNECOLOGY

## 2025-05-21 PROCEDURE — 81003 URINALYSIS AUTO W/O SCOPE: CPT | Performed by: OBSTETRICS & GYNECOLOGY

## 2025-05-21 PROCEDURE — 99222 1ST HOSP IP/OBS MODERATE 55: CPT | Performed by: OBSTETRICS & GYNECOLOGY

## 2025-05-21 PROCEDURE — 87040 BLOOD CULTURE FOR BACTERIA: CPT | Performed by: OBSTETRICS & GYNECOLOGY

## 2025-05-21 PROCEDURE — 80053 COMPREHEN METABOLIC PANEL: CPT | Performed by: OBSTETRICS & GYNECOLOGY

## 2025-05-21 PROCEDURE — 99203 OFFICE O/P NEW LOW 30 MIN: CPT | Performed by: OBSTETRICS & GYNECOLOGY

## 2025-05-21 RX ORDER — ACETAMINOPHEN 325 MG/1
650 TABLET ORAL EVERY 6 HOURS PRN
Status: DISCONTINUED | OUTPATIENT
Start: 2025-05-21 | End: 2025-05-23 | Stop reason: HOSPADM

## 2025-05-21 RX ORDER — AMOXICILLIN 250 MG
2 CAPSULE ORAL 2 TIMES DAILY
Status: DISCONTINUED | OUTPATIENT
Start: 2025-05-21 | End: 2025-05-23 | Stop reason: HOSPADM

## 2025-05-21 RX ORDER — SODIUM CHLORIDE 0.9 % (FLUSH) 0.9 %
10 SYRINGE (ML) INJECTION EVERY 12 HOURS SCHEDULED
Status: DISCONTINUED | OUTPATIENT
Start: 2025-05-21 | End: 2025-05-23 | Stop reason: HOSPADM

## 2025-05-21 RX ORDER — METRONIDAZOLE 500 MG/1
500 TABLET ORAL EVERY 8 HOURS SCHEDULED
Status: DISCONTINUED | OUTPATIENT
Start: 2025-05-21 | End: 2025-05-23 | Stop reason: HOSPADM

## 2025-05-21 RX ORDER — SODIUM CHLORIDE 0.9 % (FLUSH) 0.9 %
10 SYRINGE (ML) INJECTION AS NEEDED
Status: DISCONTINUED | OUTPATIENT
Start: 2025-05-21 | End: 2025-05-23 | Stop reason: HOSPADM

## 2025-05-21 RX ORDER — KETOROLAC TROMETHAMINE 30 MG/ML
15 INJECTION, SOLUTION INTRAMUSCULAR; INTRAVENOUS EVERY 6 HOURS PRN
Status: DISCONTINUED | OUTPATIENT
Start: 2025-05-21 | End: 2025-05-23 | Stop reason: HOSPADM

## 2025-05-21 RX ORDER — HYDROCHLOROTHIAZIDE 12.5 MG/1
12.5 TABLET ORAL DAILY
Status: DISCONTINUED | OUTPATIENT
Start: 2025-05-21 | End: 2025-05-23 | Stop reason: HOSPADM

## 2025-05-21 RX ORDER — POTASSIUM CHLORIDE 750 MG/1
40 CAPSULE, EXTENDED RELEASE ORAL 2 TIMES DAILY
Status: COMPLETED | OUTPATIENT
Start: 2025-05-21 | End: 2025-05-21

## 2025-05-21 RX ORDER — DOXYCYCLINE 50 MG/1
100 CAPSULE ORAL EVERY 12 HOURS SCHEDULED
Status: DISCONTINUED | OUTPATIENT
Start: 2025-05-21 | End: 2025-05-23 | Stop reason: HOSPADM

## 2025-05-21 RX ADMIN — METRONIDAZOLE 500 MG: 500 TABLET ORAL at 13:17

## 2025-05-21 RX ADMIN — SENNOSIDES AND DOCUSATE SODIUM 2 TABLET: 50; 8.6 TABLET ORAL at 13:17

## 2025-05-21 RX ADMIN — METRONIDAZOLE 500 MG: 500 TABLET ORAL at 21:07

## 2025-05-21 RX ADMIN — Medication 10 ML: at 11:40

## 2025-05-21 RX ADMIN — CEFTRIAXONE SODIUM 2000 MG: 2 INJECTION, POWDER, FOR SOLUTION INTRAMUSCULAR; INTRAVENOUS at 11:40

## 2025-05-21 RX ADMIN — KETOROLAC TROMETHAMINE 15 MG: 30 INJECTION, SOLUTION INTRAMUSCULAR; INTRAVENOUS at 15:17

## 2025-05-21 RX ADMIN — SENNOSIDES AND DOCUSATE SODIUM 2 TABLET: 50; 8.6 TABLET ORAL at 21:07

## 2025-05-21 RX ADMIN — DOXYCYCLINE 100 MG: 50 CAPSULE ORAL at 21:07

## 2025-05-21 RX ADMIN — POTASSIUM CHLORIDE 40 MEQ: 750 CAPSULE, EXTENDED RELEASE ORAL at 15:17

## 2025-05-21 RX ADMIN — Medication 10 ML: at 21:07

## 2025-05-21 RX ADMIN — POTASSIUM CHLORIDE 40 MEQ: 750 CAPSULE, EXTENDED RELEASE ORAL at 21:07

## 2025-05-21 NOTE — H&P
Chief Complaint   Patient presents with    Ovarian Cyst       New Patient here for ER follow up with tubo-ovarian abscess. TVS done today.      Margoth Weller is a 44 y.o. year old .  Patient's last menstrual period was 2025.  She presents to be seen because of onset of left lower quadrant pain on the  with nausea.  Initially thought perhaps food poisoning.  Concomitant to this was constipation requiring laxatives-this is unusual for patient.  She was seen in the ED on Monday CT ultrasound labs reviewed..      OTHER COMPLAINTS:  Right tube removed for ruptured ectopic many years ago.  Left tube ligated     The following portions of the patient's history were reviewed and updated as appropriate:She  has a past medical history of Allergic (1990), Bladder infection, Cholelithiasis, Depression, Ectopic pregnancy, History of blood transfusion, History of Papanicolaou smear of cervix (2014), History of urinary tract infection, Hypertension, Obesity, Other abnormal clinical finding, PONV (postoperative nausea and vomiting), Varicella, and Vertigo.  She does not have any pertinent problems on file.  She  has a past surgical history that includes Tubal ligation; Dilation and curettage, diagnostic / therapeutic; [place] tooth extraction; cholecystectomy with intraoperative cholangiogram (N/A, 2017); knee arthroscopy cartilage biopsy (Left); Salpingectomy (Right); laparoscopic lysis of adhesions (); d & c with suction; and Laparoscopic cholecystectomy.  Her family history includes Arrhythmia in her paternal grandfather; Breast cancer in her mother; Breast cancer (age of onset: 45) in her maternal aunt; Cancer in her maternal aunt; Diabetes in her mother; Heart attack in her paternal grandfather and paternal grandmother; Heart disease in her maternal grandmother, paternal grandfather, and paternal grandmother; Heart failure in her maternal grandmother and mother; Hyperlipidemia in her  paternal grandfather and paternal grandmother; Hypertension in her paternal grandfather and paternal grandmother; No Known Problems in her brother and brother.  She  reports that she has never smoked. She has never used smokeless tobacco. She reports current alcohol use. She reports that she does not use drugs.  Current Medications          Current Outpatient Medications   Medication Sig Dispense Refill    ciprofloxacin (CIPRO) 500 MG tablet Take 1 tablet by mouth 2 (Two) Times a Day for 7 days. 14 tablet 0    doxycycline (MONODOX) 100 MG capsule Take 1 capsule by mouth 2 (Two) Times a Day for 14 days. 28 capsule 0    hydroCHLOROthiazide 12.5 MG tablet Take 1 tablet by mouth Daily.        HYDROcodone-acetaminophen (NORCO) 5-325 MG per tablet Take 1 tablet by mouth Every 8 (Eight) Hours As Needed for Severe Pain for up to 3 days. 9 tablet 0    ibuprofen (ADVIL,MOTRIN) 800 MG tablet Take 1 tablet by mouth Every 8 (Eight) Hours As Needed for Mild Pain. 15 tablet 0    metroNIDAZOLE (FLAGYL) 500 MG tablet Take 1 tablet by mouth 2 (Two) Times a Day for 14 days. 28 tablet 0    naloxone (NARCAN) 4 MG/0.1ML nasal spray Call 911. Don't prime. [place] in 1 nostril for overdose. Repeat in 2-3 minutes in other nostril if no or minimal breathing/responsiveness. 2 each 0      No current facility-administered medications for this visit.         Medications Ordered Prior to Encounter        Current Outpatient Medications on File Prior to Visit   Medication Sig    ciprofloxacin (CIPRO) 500 MG tablet Take 1 tablet by mouth 2 (Two) Times a Day for 7 days.    doxycycline (MONODOX) 100 MG capsule Take 1 capsule by mouth 2 (Two) Times a Day for 14 days.    hydroCHLOROthiazide 12.5 MG tablet Take 1 tablet by mouth Daily.    HYDROcodone-acetaminophen (NORCO) 5-325 MG per tablet Take 1 tablet by mouth Every 8 (Eight) Hours As Needed for Severe Pain for up to 3 days.    ibuprofen (ADVIL,MOTRIN) 800 MG tablet Take 1 tablet by mouth Every 8  "(Eight) Hours As Needed for Mild Pain.    metroNIDAZOLE (FLAGYL) 500 MG tablet Take 1 tablet by mouth 2 (Two) Times a Day for 14 days.    naloxone (NARCAN) 4 MG/0.1ML nasal spray Call 911. Don't prime. [place] in 1 nostril for overdose. Repeat in 2-3 minutes in other nostril if no or minimal breathing/responsiveness.    [DISCONTINUED] escitalopram (LEXAPRO) 5 MG tablet Take 1.5 tablets by mouth Daily.    [DISCONTINUED] HYDROcodone-acetaminophen (NORCO) 5-325 MG per tablet Take 1 tablet by mouth Every 6 (Six) Hours As Needed for Severe Pain.      No current facility-administered medications on file prior to visit.         She has no known allergies.       Tobacco Use History   Social History    Tobacco Use      Smoking status: Never      Smokeless tobacco: Never      Review of Systems       Consitutional POS: see HPI     NEG: anorexia or night sweats   Gastointestinal POS: nothing reported     NEG: bloating, change in bowel habits, melena, or reflux symptoms   Genitourinary POS: nothing reported     NEG: dysuria or hematuria   Integument POS: nothing reported     NEG: moles that are changing in size, shape, color or rashes   Breast POS: nothing reported     NEG: persistent breast lump, skin dimpling, or nipple discharge            Respiratory: negative  Cardiovascular: negative  GYN:   See HPI           Objective  /64   Ht 167.6 cm (66\")   Wt 98.2 kg (216 lb 9.6 oz)   LMP 05/13/2025   BMI 34.96 kg/m²      General:  well developed; well nourished  no acute distress  mentation appropriate   Skin:  No suspicious lesions seen   Thyroid: normal to inspection and palpation   Lungs:  breathing is unlabored  clear to auscultation bilaterally   Heart:  regular rate and rhythm, S1, S2 normal, no murmur, click, rub or gallop   Breasts:  Not performed.   Abdomen: soft, non-tender; no masses  no umbilical or inguinal hernias are present  no hepato-splenomegaly  Abnormal findings: moderate tenderness in the LLQ   Pelvis: " Clinical staff was present for exam  External genitalia:  normal appearance of the external genitalia including Bartholin's and [employer]'s glands.  :  urethral meatus normal; urethra normal:  Vaginal:  normal pink mucosa without prolapse or lesions.  Cervix:  normal appearance.  Uterus:  normal size, shape and consistency.  Adnexa:  normal bimanual exam of the adnexa.  Rectal:  digital rectal exam not performed; anus visually normal appearing.      Psychiatric: Alert and oriented ×3, mood and affect appropriate  HEENT: Atraumatic, normocephalic, normal scleral icterus  Extremities: 2+ pulses bilaterally, no edema        Lab Review   CBC, CMP, and HCG     Imaging   CT of abdomen/pelvis report  Pelvic ultrasound report   Repeat ultrasound today shows a anteverted uterus 10.7 x 6 x 5.8 cm.  Endometrium 7.53 mm.  The intramural fibroids times three 1.2, 1.9, 1.4 cm.  There is a 9 cm elongated structure consistent with hydrosalpinx in the left adnexa with fluid.  Trace free fluid.  No solid masses     Assessment  Tubo-ovarian abscess versus sigmoid colitis.  Hydrosalpinx versus pyosalpinx noted.  Patient was started on antibiotics Monday.  Subjectively she feels the same perhaps slightly better.  She denies any significant fever however she is is still in significant amount of discomfort.     Plan  Admit.  Plan at least 48 hours of IV antibiotics transitioning to oral.  Consider repeat imaging if symptomatology does not improve.  Rocephin 2 g every 24 hours.  Doxycycline 100 mg p.o. twice daily.  Metronidazole 500 mg p.o. every 8 hours

## 2025-05-21 NOTE — PROGRESS NOTES
Subjective   Chief Complaint   Patient presents with    Ovarian Cyst     New Patient here for ER follow up with tubo-ovarian abscess. TVS done today.     Margoth Weller is a 44 y.o. year old .  Patient's last menstrual period was 2025.  She presents to be seen because of onset of left lower quadrant pain on the  with nausea.  Initially thought perhaps food poisoning.  Concomitant to this was constipation requiring laxatives-this is unusual for patient.  She was seen in the ED on Monday CT ultrasound labs reviewed..     OTHER COMPLAINTS:  Right tube removed for ruptured ectopic many years ago.  Left tube ligated    The following portions of the patient's history were reviewed and updated as appropriate:She  has a past medical history of Allergic (1990), Bladder infection, Cholelithiasis, Depression, Ectopic pregnancy, History of blood transfusion, History of Papanicolaou smear of cervix (2014), History of urinary tract infection, Hypertension, Obesity, Other abnormal clinical finding, PONV (postoperative nausea and vomiting), Varicella, and Vertigo.  She does not have any pertinent problems on file.  She  has a past surgical history that includes Tubal ligation; Dilation and curettage, diagnostic / therapeutic; Chinle tooth extraction; cholecystectomy with intraoperative cholangiogram (N/A, 2017); knee arthroscopy cartilage biopsy (Left); Salpingectomy (Right); laparoscopic lysis of adhesions (); d & c with suction; and Laparoscopic cholecystectomy.  Her family history includes Arrhythmia in her paternal grandfather; Breast cancer in her mother; Breast cancer (age of onset: 45) in her maternal aunt; Cancer in her maternal aunt; Diabetes in her mother; Heart attack in her paternal grandfather and paternal grandmother; Heart disease in her maternal grandmother, paternal grandfather, and paternal grandmother; Heart failure in her maternal grandmother and mother; Hyperlipidemia in  her paternal grandfather and paternal grandmother; Hypertension in her paternal grandfather and paternal grandmother; No Known Problems in her brother and brother.  She  reports that she has never smoked. She has never used smokeless tobacco. She reports current alcohol use. She reports that she does not use drugs.  Current Outpatient Medications   Medication Sig Dispense Refill    ciprofloxacin (CIPRO) 500 MG tablet Take 1 tablet by mouth 2 (Two) Times a Day for 7 days. 14 tablet 0    doxycycline (MONODOX) 100 MG capsule Take 1 capsule by mouth 2 (Two) Times a Day for 14 days. 28 capsule 0    hydroCHLOROthiazide 12.5 MG tablet Take 1 tablet by mouth Daily.      HYDROcodone-acetaminophen (NORCO) 5-325 MG per tablet Take 1 tablet by mouth Every 8 (Eight) Hours As Needed for Severe Pain for up to 3 days. 9 tablet 0    ibuprofen (ADVIL,MOTRIN) 800 MG tablet Take 1 tablet by mouth Every 8 (Eight) Hours As Needed for Mild Pain. 15 tablet 0    metroNIDAZOLE (FLAGYL) 500 MG tablet Take 1 tablet by mouth 2 (Two) Times a Day for 14 days. 28 tablet 0    naloxone (NARCAN) 4 MG/0.1ML nasal spray Call 911. Don't prime. Muse in 1 nostril for overdose. Repeat in 2-3 minutes in other nostril if no or minimal breathing/responsiveness. 2 each 0     No current facility-administered medications for this visit.     Current Outpatient Medications on File Prior to Visit   Medication Sig    ciprofloxacin (CIPRO) 500 MG tablet Take 1 tablet by mouth 2 (Two) Times a Day for 7 days.    doxycycline (MONODOX) 100 MG capsule Take 1 capsule by mouth 2 (Two) Times a Day for 14 days.    hydroCHLOROthiazide 12.5 MG tablet Take 1 tablet by mouth Daily.    HYDROcodone-acetaminophen (NORCO) 5-325 MG per tablet Take 1 tablet by mouth Every 8 (Eight) Hours As Needed for Severe Pain for up to 3 days.    ibuprofen (ADVIL,MOTRIN) 800 MG tablet Take 1 tablet by mouth Every 8 (Eight) Hours As Needed for Mild Pain.    metroNIDAZOLE (FLAGYL) 500 MG tablet Take  "1 tablet by mouth 2 (Two) Times a Day for 14 days.    naloxone (NARCAN) 4 MG/0.1ML nasal spray Call 911. Don't prime. Taylor in 1 nostril for overdose. Repeat in 2-3 minutes in other nostril if no or minimal breathing/responsiveness.    [DISCONTINUED] escitalopram (LEXAPRO) 5 MG tablet Take 1.5 tablets by mouth Daily.    [DISCONTINUED] HYDROcodone-acetaminophen (NORCO) 5-325 MG per tablet Take 1 tablet by mouth Every 6 (Six) Hours As Needed for Severe Pain.     No current facility-administered medications on file prior to visit.     She has no known allergies.    Social History    Tobacco Use      Smoking status: Never      Smokeless tobacco: Never    Review of Systems  Consitutional POS: see HPI    NEG: anorexia or night sweats   Gastointestinal POS: nothing reported    NEG: bloating, change in bowel habits, melena, or reflux symptoms   Genitourinary POS: nothing reported    NEG: dysuria or hematuria   Integument POS: nothing reported    NEG: moles that are changing in size, shape, color or rashes   Breast POS: nothing reported    NEG: persistent breast lump, skin dimpling, or nipple discharge         Respiratory: negative  Cardiovascular: negative  GYN:   See HPI          Objective   /64   Ht 167.6 cm (66\")   Wt 98.2 kg (216 lb 9.6 oz)   LMP 05/13/2025   BMI 34.96 kg/m²     General:  well developed; well nourished  no acute distress  mentation appropriate   Skin:  No suspicious lesions seen   Thyroid: normal to inspection and palpation   Lungs:  breathing is unlabored  clear to auscultation bilaterally   Heart:  regular rate and rhythm, S1, S2 normal, no murmur, click, rub or gallop   Breasts:  Not performed.   Abdomen: soft, non-tender; no masses  no umbilical or inguinal hernias are present  no hepato-splenomegaly  Abnormal findings: moderate tenderness in the LLQ   Pelvis: Clinical staff was present for exam  External genitalia:  normal appearance of the external genitalia including Bartholin's and " [place]'s glands.  :  urethral meatus normal; urethra normal:  Vaginal:  normal pink mucosa without prolapse or lesions.  Cervix:  normal appearance.  Uterus:  normal size, shape and consistency.  Adnexa:  normal bimanual exam of the adnexa.  Rectal:  digital rectal exam not performed; anus visually normal appearing.     Psychiatric: Alert and oriented ×3, mood and affect appropriate  HEENT: Atraumatic, normocephalic, normal scleral icterus  Extremities: 2+ pulses bilaterally, no edema      Lab Review   CBC, CMP, and HCG    Imaging   CT of abdomen/pelvis report  Pelvic ultrasound report   Repeat ultrasound today shows a anteverted uterus 10.7 x 6 x 5.8 cm.  Endometrium 7.53 mm.  The intramural fibroids times three 1.2, 1.9, 1.4 cm.  There is a 9 cm elongated structure consistent with hydrosalpinx in the left adnexa with fluid.  Trace free fluid.  No solid masses     Assessment   Tubo-ovarian abscess versus sigmoid colitis.  Hydrosalpinx versus pyosalpinx noted.  Patient was started on antibiotics Monday.  Subjectively she feels the same perhaps slightly better.  She denies any significant fever however she is is still in significant amount of discomfort.     Plan   Admit.  Plan at least 48 hours of IV antibiotics transitioning to oral.  Consider repeat imaging if symptomatology does not improve.  Rocephin 2 g every 24 hours.  Doxycycline 100 mg p.o. twice daily.  Metronidazole 500 mg p.o. every 8 hours    No orders of the defined types were placed in this encounter.         This note was electronically signed.      May 21, 2025

## 2025-05-21 NOTE — PROGRESS NOTES
May 21, 2025      My name is Veronica Zamarripa      I am  with [facility]  [address], [address]  [facility] [address] [phone].      I am reaching out to you to officially welcome you to our practice and ask about your recent visit.     Tell me about your visit with us. What things went well?         We're always looking for ways to make our patients' experiences even better. Do you have recommendations on ways we may improve?      Overall were you satisfied with your first visit to our practice?        I would appreciate you taking the time to complete the survey. Your feedback is greatly appreciated! Please reply directly to this e-mail should you have any questions or concerns.      Thank you, and have a great day.

## 2025-05-21 NOTE — PLAN OF CARE
Problem: Adult Inpatient Plan of Care  Goal: Plan of Care Review  5/21/2025 1658 by Janeth Clay RN  Outcome: Progressing  5/21/2025 1017 by Janeth Clay RN  Outcome: Progressing  Goal: Patient-Specific Goal (Individualized)  5/21/2025 1658 by Janeth Clay RN  Outcome: Progressing  5/21/2025 1017 by Janeth Clay RN  Outcome: Progressing  Goal: Absence of Hospital-Acquired Illness or Injury  5/21/2025 1658 by Janeth Clay RN  Outcome: Progressing  5/21/2025 1017 by Janeth Clay RN  Outcome: Progressing  Intervention: Identify and Manage Fall Risk  Recent Flowsheet Documentation  Taken 5/21/2025 1600 by Janeth Clay RN  Safety Promotion/Fall Prevention:   nonskid shoes/slippers when out of bed   safety round/check completed  Taken 5/21/2025 1400 by Janeth Clay RN  Safety Promotion/Fall Prevention:   nonskid shoes/slippers when out of bed   safety round/check completed  Taken 5/21/2025 1013 by Janeth Clay RN  Safety Promotion/Fall Prevention:   nonskid shoes/slippers when out of bed   safety round/check completed  Intervention: Prevent Skin Injury  Recent Flowsheet Documentation  Taken 5/21/2025 1600 by Janeth Clay RN  Body Position: position changed independently  Taken 5/21/2025 1400 by Janeth Clay RN  Body Position: position changed independently  Taken 5/21/2025 1013 by Janeth Clay RN  Body Position: position changed independently  Intervention: Prevent Infection  Recent Flowsheet Documentation  Taken 5/21/2025 1600 by Janeth Clay RN  Infection Prevention: environmental surveillance performed  Taken 5/21/2025 1400 by Janeth Clay RN  Infection Prevention: environmental surveillance performed  Taken 5/21/2025 1013 by Janeth Clay RN  Infection Prevention: environmental surveillance performed  Goal: Optimal Comfort and Wellbeing  5/21/2025 1658 by Janeth Clay RN  Outcome: Progressing  5/21/2025 1017 by Janeth Clay RN  Outcome:  Progressing  Goal: Readiness for Transition of Care  5/21/2025 1658 by Janeth Clay, RN  Outcome: Progressing  5/21/2025 1017 by Janeth Clay, RN  Outcome: Progressing  Intervention: Mutually Develop Transition Plan  Recent Flowsheet Documentation  Taken 5/21/2025 1007 by Janeth Clay, RN  Equipment Currently Used at Home: none  Transportation Anticipated: car, drives self  Patient/Family Anticipated Services at Transition: none  Patient/Family Anticipates Transition to: home   Goal Outcome Evaluation:

## 2025-05-22 LAB
ALBUMIN SERPL-MCNC: 3.3 G/DL (ref 3.5–5.2)
ALBUMIN/GLOB SERPL: 1.1 G/DL
ALP SERPL-CCNC: 70 U/L (ref 39–117)
ALT SERPL W P-5'-P-CCNC: 21 U/L (ref 1–33)
ANION GAP SERPL CALCULATED.3IONS-SCNC: 10.3 MMOL/L (ref 5–15)
AST SERPL-CCNC: 15 U/L (ref 1–32)
BASOPHILS # BLD AUTO: 0.04 10*3/MM3 (ref 0–0.2)
BASOPHILS NFR BLD AUTO: 0.6 % (ref 0–1.5)
BILIRUB SERPL-MCNC: 0.3 MG/DL (ref 0–1.2)
BUN SERPL-MCNC: 7 MG/DL (ref 6–20)
BUN/CREAT SERPL: 9 (ref 7–25)
CALCIUM SPEC-SCNC: 8.7 MG/DL (ref 8.6–10.5)
CHLORIDE SERPL-SCNC: 108 MMOL/L (ref 98–107)
CO2 SERPL-SCNC: 21.7 MMOL/L (ref 22–29)
CREAT SERPL-MCNC: 0.78 MG/DL (ref 0.57–1)
DEPRECATED RDW RBC AUTO: 40.3 FL (ref 37–54)
EGFRCR SERPLBLD CKD-EPI 2021: 95 ML/MIN/1.73
EOSINOPHIL # BLD AUTO: 0.15 10*3/MM3 (ref 0–0.4)
EOSINOPHIL NFR BLD AUTO: 2.1 % (ref 0.3–6.2)
ERYTHROCYTE [DISTWIDTH] IN BLOOD BY AUTOMATED COUNT: 13.4 % (ref 12.3–15.4)
GLOBULIN UR ELPH-MCNC: 2.9 GM/DL
GLUCOSE SERPL-MCNC: 108 MG/DL (ref 65–99)
HCT VFR BLD AUTO: 31.6 % (ref 34–46.6)
HGB BLD-MCNC: 10.4 G/DL (ref 12–15.9)
IMM GRANULOCYTES # BLD AUTO: 0.03 10*3/MM3 (ref 0–0.05)
IMM GRANULOCYTES NFR BLD AUTO: 0.4 % (ref 0–0.5)
LYMPHOCYTES # BLD AUTO: 1.28 10*3/MM3 (ref 0.7–3.1)
LYMPHOCYTES NFR BLD AUTO: 17.9 % (ref 19.6–45.3)
MCH RBC QN AUTO: 27 PG (ref 26.6–33)
MCHC RBC AUTO-ENTMCNC: 32.9 G/DL (ref 31.5–35.7)
MCV RBC AUTO: 82.1 FL (ref 79–97)
MONOCYTES # BLD AUTO: 0.5 10*3/MM3 (ref 0.1–0.9)
MONOCYTES NFR BLD AUTO: 7 % (ref 5–12)
NEUTROPHILS NFR BLD AUTO: 5.15 10*3/MM3 (ref 1.7–7)
NEUTROPHILS NFR BLD AUTO: 72 % (ref 42.7–76)
NRBC BLD AUTO-RTO: 0 /100 WBC (ref 0–0.2)
PLATELET # BLD AUTO: 243 10*3/MM3 (ref 140–450)
PMV BLD AUTO: 11 FL (ref 6–12)
POTASSIUM SERPL-SCNC: 4.5 MMOL/L (ref 3.5–5.2)
PROT SERPL-MCNC: 6.2 G/DL (ref 6–8.5)
RBC # BLD AUTO: 3.85 10*6/MM3 (ref 3.77–5.28)
SODIUM SERPL-SCNC: 140 MMOL/L (ref 136–145)
WBC NRBC COR # BLD AUTO: 7.15 10*3/MM3 (ref 3.4–10.8)

## 2025-05-22 PROCEDURE — 80053 COMPREHEN METABOLIC PANEL: CPT | Performed by: STUDENT IN AN ORGANIZED HEALTH CARE EDUCATION/TRAINING PROGRAM

## 2025-05-22 PROCEDURE — 85025 COMPLETE CBC W/AUTO DIFF WBC: CPT | Performed by: STUDENT IN AN ORGANIZED HEALTH CARE EDUCATION/TRAINING PROGRAM

## 2025-05-22 PROCEDURE — 25010000002 KETOROLAC TROMETHAMINE PER 15 MG: Performed by: STUDENT IN AN ORGANIZED HEALTH CARE EDUCATION/TRAINING PROGRAM

## 2025-05-22 PROCEDURE — 25010000002 CEFTRIAXONE PER 250 MG: Performed by: STUDENT IN AN ORGANIZED HEALTH CARE EDUCATION/TRAINING PROGRAM

## 2025-05-22 RX ORDER — POLYETHYLENE GLYCOL 3350 17 G/17G
17 POWDER, FOR SOLUTION ORAL DAILY PRN
Status: DISCONTINUED | OUTPATIENT
Start: 2025-05-22 | End: 2025-05-23 | Stop reason: HOSPADM

## 2025-05-22 RX ORDER — BISACODYL 10 MG
10 SUPPOSITORY, RECTAL RECTAL DAILY PRN
Status: DISCONTINUED | OUTPATIENT
Start: 2025-05-22 | End: 2025-05-23 | Stop reason: HOSPADM

## 2025-05-22 RX ADMIN — Medication 10 ML: at 20:45

## 2025-05-22 RX ADMIN — Medication 10 ML: at 08:41

## 2025-05-22 RX ADMIN — HYDROCHLOROTHIAZIDE 12.5 MG: 12.5 CAPSULE ORAL at 08:41

## 2025-05-22 RX ADMIN — DOXYCYCLINE 100 MG: 50 CAPSULE ORAL at 20:44

## 2025-05-22 RX ADMIN — METRONIDAZOLE 500 MG: 500 TABLET ORAL at 21:55

## 2025-05-22 RX ADMIN — KETOROLAC TROMETHAMINE 15 MG: 30 INJECTION, SOLUTION INTRAMUSCULAR; INTRAVENOUS at 03:04

## 2025-05-22 RX ADMIN — CEFTRIAXONE SODIUM 2000 MG: 2 INJECTION, POWDER, FOR SOLUTION INTRAMUSCULAR; INTRAVENOUS at 11:25

## 2025-05-22 RX ADMIN — DOXYCYCLINE 100 MG: 50 CAPSULE ORAL at 08:41

## 2025-05-22 RX ADMIN — METRONIDAZOLE 500 MG: 500 TABLET ORAL at 05:46

## 2025-05-22 RX ADMIN — METRONIDAZOLE 500 MG: 500 TABLET ORAL at 14:10

## 2025-05-22 RX ADMIN — POLYETHYLENE GLYCOL 3350 17 G: 17 POWDER, FOR SOLUTION ORAL at 11:25

## 2025-05-22 RX ADMIN — SENNOSIDES AND DOCUSATE SODIUM 2 TABLET: 50; 8.6 TABLET ORAL at 08:41

## 2025-05-22 NOTE — PLAN OF CARE
Goal Outcome Evaluation:      VSS on RA. Pain controlled with PRN meds. Abx administered per MAR. No acute events this shift.

## 2025-05-22 NOTE — PROGRESS NOTES
TIMOTHY Brown  [facility]  : 1980  MRN: [id number]  CSN: [id number]    admission Day #2  Subjective   Her pain has improved.  She took pain meds during the night. She is voiding without difficulty. She is passing gas but feels like she is constipated. She had a very small BM yesterday. She took Miralax and a laxative 2 days ago. She states nausea has improved.     Objective     Min/max vitals past 24 hours:   Temp  Min: 97.7 °F (36.5 °C)  Max: 98.2 °F (36.8 °C)  BP  Min: 95/67  Max: 116/76  Pulse  Min: 70  Max: 84  Resp  Min: 8  Max: 18        I/O last 3 completed shifts:  In: 580 [P.O.:480; IV Piggyback:100]  Out: 2800 [Urine:2800]    General: well developed; well nourished  no acute distress   Resp: breathing is unlabored   CV: Not performed.   Abdomen: soft, mild tenderness LLQ of abdomen; no masses   Pelvic: Not performed   Ext: normal appearance with no cyanosis or edema and no calf tenderness       WBC   Date/Time Value Ref Range Status   2025 0615 7.15 3.40 - 10.80 10*3/mm3 Final     Hemoglobin   Date/Time Value Ref Range Status   2025 0615 10.4 (L) 12.0 - 15.9 g/dL Final     Hematocrit   Date/Time Value Ref Range Status   2025 0615 31.6 (L) 34.0 - 46.6 % Final     Platelets   Date/Time Value Ref Range Status   2025 0615 243 140 - 450 10*3/mm3 Final        Assessment   Tubo ovarian abscess     Plan   Ambulate  Continue IV antibiotics    Gypsy Leblanc, APRN  2025  09:53 EDT

## 2025-05-22 NOTE — PAYOR COMM NOTE
"TO:BC  FROM:JUVENTINO MELÉNDEZ, RN PHONE [phone]  CLINICALS REF# [id number]    Margoth Weller (44 y.o. Female)       Date of Birth   1980    Social Security Number       Address   [address] [place] [address]    Home Phone   [phone]    MRN   [id number]       Congregational   Jain    Marital Status                               Admission Date   2025    Admission Type   Urgent    Admitting Provider   Edgar Perez MD    Attending Provider   Edgar Perez MD    Department, Room/Bed   [facility], W202/       Discharge Date       Discharge Disposition       Discharge Destination                                 Attending Provider: Edgar Perez MD    Allergies: No Known Allergies    Isolation: None   Infection: None   Code Status: CPR    Ht: 167.6 cm (66\")   Wt: 98.2 kg (216 lb 9.6 oz)    Admission Cmt: None   Principal Problem: TOA (tubo-ovarian abscess) [N70.93]                   Active Insurance as of 2025       Primary Coverage       Payor Plan Insurance Group Employer/Plan Group    [employer] PPO [id number]       Payor Plan Address Payor Plan Phone Number Payor Plan Fax Number Effective Dates    [address] [phone]  2014 - None Entered    [facility] [id number]         Subscriber Name Subscriber Birth Date Member ID       SMILEY WELLER 1978 [id number]                     Emergency Contacts        (Rel.) Home Phone Work Phone Mobile Phone    Mike Weller (Spouse) [phone] -- --                 History & Physical        Edgar Perez MD at 25 1108            Chief Complaint   Patient presents with    Ovarian Cyst       New Patient here for ER follow up with tubo-ovarian abscess. TVS done today.      Margoth Weller is a 44 y.o. year old .  Patient's last menstrual period was 2025.  She presents to be " seen because of onset of left lower quadrant pain on the 13th with nausea.  Initially thought perhaps food poisoning.  Concomitant to this was constipation requiring laxatives-this is unusual for patient.  She was seen in the ED on Monday CT ultrasound labs reviewed..      OTHER COMPLAINTS:  Right tube removed for ruptured ectopic many years ago.  Left tube ligated     The following portions of the patient's history were reviewed and updated as appropriate:She  has a past medical history of Allergic (9/1/1990), Bladder infection, Cholelithiasis, Depression, Ectopic pregnancy, History of blood transfusion, History of Papanicolaou smear of cervix (12/11/2014), History of urinary tract infection, Hypertension, Obesity, Other abnormal clinical finding, PONV (postoperative nausea and vomiting), Varicella, and Vertigo.  She does not have any pertinent problems on file.  She  has a past surgical history that includes Tubal ligation; Dilation and curettage, diagnostic / therapeutic; [place] tooth extraction; cholecystectomy with intraoperative cholangiogram (N/A, 03/27/2017); knee arthroscopy cartilage biopsy (Left); Salpingectomy (Right); laparoscopic lysis of adhesions (2003); d & c with suction; and Laparoscopic cholecystectomy.  Her family history includes Arrhythmia in her paternal grandfather; Breast cancer in her mother; Breast cancer (age of onset: 45) in her maternal aunt; Cancer in her maternal aunt; Diabetes in her mother; Heart attack in her paternal grandfather and paternal grandmother; Heart disease in her maternal grandmother, paternal grandfather, and paternal grandmother; Heart failure in her maternal grandmother and mother; Hyperlipidemia in her paternal grandfather and paternal grandmother; Hypertension in her paternal grandfather and paternal grandmother; No Known Problems in her brother and brother.  She  reports that she has never smoked. She has never used smokeless tobacco. She reports current alcohol  use. She reports that she does not use drugs.  Current Medications          Current Outpatient Medications   Medication Sig Dispense Refill    ciprofloxacin (CIPRO) 500 MG tablet Take 1 tablet by mouth 2 (Two) Times a Day for 7 days. 14 tablet 0    doxycycline (MONODOX) 100 MG capsule Take 1 capsule by mouth 2 (Two) Times a Day for 14 days. 28 capsule 0    hydroCHLOROthiazide 12.5 MG tablet Take 1 tablet by mouth Daily.        HYDROcodone-acetaminophen (NORCO) 5-325 MG per tablet Take 1 tablet by mouth Every 8 (Eight) Hours As Needed for Severe Pain for up to 3 days. 9 tablet 0    ibuprofen (ADVIL,MOTRIN) 800 MG tablet Take 1 tablet by mouth Every 8 (Eight) Hours As Needed for Mild Pain. 15 tablet 0    metroNIDAZOLE (FLAGYL) 500 MG tablet Take 1 tablet by mouth 2 (Two) Times a Day for 14 days. 28 tablet 0    naloxone (NARCAN) 4 MG/0.1ML nasal spray Call 911. Don't prime. [place] in 1 nostril for overdose. Repeat in 2-3 minutes in other nostril if no or minimal breathing/responsiveness. 2 each 0      No current facility-administered medications for this visit.         Medications Ordered Prior to Encounter        Current Outpatient Medications on File Prior to Visit   Medication Sig    ciprofloxacin (CIPRO) 500 MG tablet Take 1 tablet by mouth 2 (Two) Times a Day for 7 days.    doxycycline (MONODOX) 100 MG capsule Take 1 capsule by mouth 2 (Two) Times a Day for 14 days.    hydroCHLOROthiazide 12.5 MG tablet Take 1 tablet by mouth Daily.    HYDROcodone-acetaminophen (NORCO) 5-325 MG per tablet Take 1 tablet by mouth Every 8 (Eight) Hours As Needed for Severe Pain for up to 3 days.    ibuprofen (ADVIL,MOTRIN) 800 MG tablet Take 1 tablet by mouth Every 8 (Eight) Hours As Needed for Mild Pain.    metroNIDAZOLE (FLAGYL) 500 MG tablet Take 1 tablet by mouth 2 (Two) Times a Day for 14 days.    naloxone (NARCAN) 4 MG/0.1ML nasal spray Call 911. Don't prime. [place] in 1 nostril for overdose. Repeat in 2-3 minutes in other  "nostril if no or minimal breathing/responsiveness.    [DISCONTINUED] escitalopram (LEXAPRO) 5 MG tablet Take 1.5 tablets by mouth Daily.    [DISCONTINUED] HYDROcodone-acetaminophen (NORCO) 5-325 MG per tablet Take 1 tablet by mouth Every 6 (Six) Hours As Needed for Severe Pain.      No current facility-administered medications on file prior to visit.         She has no known allergies.       Tobacco Use History   Social History    Tobacco Use      Smoking status: Never      Smokeless tobacco: Never      Review of Systems       Consitutional POS: see HPI     NEG: anorexia or night sweats   Gastointestinal POS: nothing reported     NEG: bloating, change in bowel habits, melena, or reflux symptoms   Genitourinary POS: nothing reported     NEG: dysuria or hematuria   Integument POS: nothing reported     NEG: moles that are changing in size, shape, color or rashes   Breast POS: nothing reported     NEG: persistent breast lump, skin dimpling, or nipple discharge            Respiratory: negative  Cardiovascular: negative  GYN:   See HPI           Objective  /64   Ht 167.6 cm (66\")   Wt 98.2 kg (216 lb 9.6 oz)   LMP 05/13/2025   BMI 34.96 kg/m²      General:  well developed; well nourished  no acute distress  mentation appropriate   Skin:  No suspicious lesions seen   Thyroid: normal to inspection and palpation   Lungs:  breathing is unlabored  clear to auscultation bilaterally   Heart:  regular rate and rhythm, S1, S2 normal, no murmur, click, rub or gallop   Breasts:  Not performed.   Abdomen: soft, non-tender; no masses  no umbilical or inguinal hernias are present  no hepato-splenomegaly  Abnormal findings: moderate tenderness in the LLQ   Pelvis: Clinical staff was present for exam  External genitalia:  normal appearance of the external genitalia including Bartholin's and [place]olog[place]'s glands.  :  urethral meatus normal; urethra normal:  Vaginal:  normal pink mucosa without prolapse or lesions.  Cervix:  normal " appearance.  Uterus:  normal size, shape and consistency.  Adnexa:  normal bimanual exam of the adnexa.  Rectal:  digital rectal exam not performed; anus visually normal appearing.      Psychiatric: Alert and oriented ×3, mood and affect appropriate  HEENT: Atraumatic, normocephalic, normal scleral icterus  Extremities: 2+ pulses bilaterally, no edema        Lab Review   CBC, CMP, and HCG     Imaging   CT of abdomen/pelvis report  Pelvic ultrasound report   Repeat ultrasound today shows a anteverted uterus 10.7 x 6 x 5.8 cm.  Endometrium 7.53 mm.  The intramural fibroids times three 1.2, 1.9, 1.4 cm.  There is a 9 cm elongated structure consistent with hydrosalpinx in the left adnexa with fluid.  Trace free fluid.  No solid masses     Assessment  Tubo-ovarian abscess versus sigmoid colitis.  Hydrosalpinx versus pyosalpinx noted.  Patient was started on antibiotics Monday.  Subjectively she feels the same perhaps slightly better.  She denies any significant fever however she is is still in significant amount of discomfort.     Plan  Admit.  Plan at least 48 hours of IV antibiotics transitioning to oral.  Consider repeat imaging if symptomatology does not improve.  Rocephin 2 g every 24 hours.  Doxycycline 100 mg p.o. twice daily.  Metronidazole 500 mg p.o. every 8 hours    Electronically signed by Edgar Perez MD at 05/21/25 1109       Vital Signs (last day)       Date/Time Temp Temp src Pulse Resp BP Patient Position SpO2    05/22/25 0300 98.2 (36.8) Oral 83 18 116/76 Lying 97    05/21/25 2300 98.2 (36.8) Oral 80 18 97/64 Lying 98    05/21/25 1900 98.1 (36.7) Oral 84 18 95/67 Lying 97    05/21/25 1533 98.1 (36.7) Oral 72 8 107/75 Lying 96    05/21/25 1121 -- -- 77 18 104/69 Lying 98    05/21/25 0950 98 (36.7) Oral 78 18 119/83 Lying 98          Current Facility-Administered Medications   Medication Dose Route Frequency Provider Last Rate Last Admin    acetaminophen (TYLENOL) tablet 650 mg  650 mg Oral  Q6H PRN Hilary Stern MD        cefTRIAXone (ROCEPHIN) 2,000 mg in sodium chloride 0.9 % 100 mL IVPB-VTB  2,000 mg Intravenous Q24H Hilary Stern  mL/hr at 05/21/25 1140 2,000 mg at 05/21/25 1140    doxycycline (MONODOX) capsule 100 mg  100 mg Oral Q12H Hilary Stern MD   100 mg at 05/21/25 2107    hydroCHLOROthiazide oral 12.5 mg  12.5 mg Oral Daily Hilary Stern MD        ketorolac (TORADOL) injection 15 mg  15 mg Intravenous Q6H PRN Hilary Stern MD   15 mg at 05/22/25 0304    metroNIDAZOLE (FLAGYL) tablet 500 mg  500 mg Oral Q8H Hilary Stern MD   500 mg at 05/22/25 0546    sennosides-docusate (PERICOLACE) 8.6-50 MG per tablet 2 tablet  2 tablet Oral BID Hilary Stern MD   2 tablet at 05/21/25 2107    sodium chloride 0.9 % flush 10 mL  10 mL Intravenous Q12H Hilary Stern MD   10 mL at 05/21/25 2107    sodium chloride 0.9 % flush 10 mL  10 mL Intravenous PRN Hilary Stern MD         Lab Results (last 24 hours)       Procedure Component Value Units Date/Time    Comprehensive Metabolic Panel [[id number]]  (Abnormal) Collected: 05/22/25 0615    Specimen: Blood Updated: 05/22/25 0726     Glucose 108 mg/dL      BUN 7 mg/dL      Creatinine 0.78 mg/dL      Sodium 140 mmol/L      Potassium 4.5 mmol/L      Chloride 108 mmol/L      CO2 21.7 mmol/L      Calcium 8.7 mg/dL      Total Protein 6.2 g/dL      Albumin 3.3 g/dL      ALT (SGPT) 21 U/L      AST (SGOT) 15 U/L      Alkaline Phosphatase 70 U/L      Total Bilirubin 0.3 mg/dL      Globulin 2.9 gm/dL      A/G Ratio 1.1 g/dL      BUN/Creatinine Ratio 9.0     Anion Gap 10.3 mmol/L      eGFR 95.0 mL/min/1.73     Narrative:      GFR Categories in Chronic Kidney Disease (CKD)              GFR Category          GFR (mL/min/1.73)    Interpretation  G1                    90 or greater        Normal or high (1)  G2                    60-89                Mild decrease (1)  G3a                   45-59                Mild to moderate  decrease  G3b                   30-44                Moderate to severe decrease  G4                    15-29                Severe decrease  G5                    14 or less           Kidney failure    (1)In the absence of evidence of kidney disease, neither GFR category G1 or G2 fulfill the criteria for CKD.    eGFR calculation 2021 CKD-EPI creatinine equation, which does not include race as a factor    CBC & Differential [[id number]]  (Abnormal) Collected: 05/22/25 0615    Specimen: Blood Updated: 05/22/25 0658    Narrative:      The following orders were created for panel order CBC & Differential.  Procedure                               Abnormality         Status                     ---------                               -----------         ------                     CBC Auto Differential[[id number]]        Abnormal            Final result                 Please view results for these tests on the individual orders.    CBC Auto Differential [[id number]]  (Abnormal) Collected: 05/22/25 0615    Specimen: Blood Updated: 05/22/25 0658     WBC 7.15 10*3/mm3      RBC 3.85 10*6/mm3      Hemoglobin 10.4 g/dL      Hematocrit 31.6 %      MCV 82.1 fL      MCH 27.0 pg      MCHC 32.9 g/dL      RDW 13.4 %      RDW-SD 40.3 fl      MPV 11.0 fL      Platelets 243 10*3/mm3      Neutrophil % 72.0 %      Lymphocyte % 17.9 %      Monocyte % 7.0 %      Eosinophil % 2.1 %      Basophil % 0.6 %      Immature Grans % 0.4 %      Neutrophils, Absolute 5.15 10*3/mm3      Lymphocytes, Absolute 1.28 10*3/mm3      Monocytes, Absolute 0.50 10*3/mm3      Eosinophils, Absolute 0.15 10*3/mm3      Basophils, Absolute 0.04 10*3/mm3      Immature Grans, Absolute 0.03 10*3/mm3      nRBC 0.0 /100 WBC     Blood Culture With RITA - Blood, Arm, Left [[id number]]  (Normal) Collected: 05/21/25 1253    Specimen: Blood from Arm, Left Updated: 05/22/25 0100     Blood Culture No growth at less than 24 hours    Blood Culture With RITA - Blood, Arm, Right  [[id number]]  (Normal) Collected: 05/21/25 1247    Specimen: Blood from Arm, Right Updated: 05/22/25 0100     Blood Culture No growth at less than 24 hours    Urinalysis With Culture If Indicated - Urine, Clean Catch [[id number]]  (Normal) Collected: 05/21/25 1328    Specimen: Urine, Clean Catch Updated: 05/21/25 1340     Color, UA Yellow     Appearance, UA Clear     pH, UA 7.0     Specific Gravity, UA 1.007     Glucose, UA Negative     Ketones, UA Negative     Bilirubin, UA Negative     Blood, UA Negative     Protein, UA Negative     Leuk Esterase, UA Negative     Nitrite, UA Negative     Urobilinogen, UA 1.0 E.U./dL    Narrative:      In absence of clinical symptoms, the presence of pyuria, bacteria, and/or nitrites on the urinalysis result does not correlate with infection.  Urine microscopic not indicated.    Comprehensive Metabolic Panel [[id number]]  (Abnormal) Collected: 05/21/25 1254    Specimen: Blood Updated: 05/21/25 1325     Glucose 122 mg/dL      BUN 7 mg/dL      Creatinine 0.82 mg/dL      Sodium 136 mmol/L      Potassium 3.3 mmol/L      Chloride 99 mmol/L      CO2 27.1 mmol/L      Calcium 8.7 mg/dL      Total Protein 6.8 g/dL      Albumin 3.6 g/dL      ALT (SGPT) 29 U/L      AST (SGOT) 25 U/L      Alkaline Phosphatase 82 U/L      Total Bilirubin 0.3 mg/dL      Globulin 3.2 gm/dL      A/G Ratio 1.1 g/dL      BUN/Creatinine Ratio 8.5     Anion Gap 9.9 mmol/L      eGFR 89.5 mL/min/1.73     Narrative:      GFR Categories in Chronic Kidney Disease (CKD)              GFR Category          GFR (mL/min/1.73)    Interpretation  G1                    90 or greater        Normal or high (1)  G2                    60-89                Mild decrease (1)  G3a                   45-59                Mild to moderate decrease  G3b                   30-44                Moderate to severe decrease  G4                    15-29                Severe decrease  G5                    14 or less           Kidney failure    (1)In  the absence of evidence of kidney disease, neither GFR category G1 or G2 fulfill the criteria for CKD.    eGFR calculation 2021 CKD-EPI creatinine equation, which does not include race as a factor    Lactic Acid, Plasma [[id number]]  (Normal) Collected: 05/21/25 1255    Specimen: Blood Updated: 05/21/25 1321     Lactate 1.6 mmol/L     CBC & Differential [[id number]]  (Abnormal) Collected: 05/21/25 1255    Specimen: Blood Updated: 05/21/25 1306    Narrative:      The following orders were created for panel order CBC & Differential.  Procedure                               Abnormality         Status                     ---------                               -----------         ------                     CBC Auto Differential[[id number]]        Abnormal            Final result                 Please view results for these tests on the individual orders.    CBC Auto Differential [[id number]]  (Abnormal) Collected: 05/21/25 1255    Specimen: Blood Updated: 05/21/25 1306     WBC 9.46 10*3/mm3      RBC 3.91 10*6/mm3      Hemoglobin 10.9 g/dL      Hematocrit 31.8 %      MCV 81.3 fL      MCH 27.9 pg      MCHC 34.3 g/dL      RDW 13.5 %      RDW-SD 39.8 fl      MPV 10.6 fL      Platelets 249 10*3/mm3      Neutrophil % 82.1 %      Lymphocyte % 10.7 %      Monocyte % 5.8 %      Eosinophil % 0.8 %      Basophil % 0.4 %      Immature Grans % 0.2 %      Neutrophils, Absolute 7.76 10*3/mm3      Lymphocytes, Absolute 1.01 10*3/mm3      Monocytes, Absolute 0.55 10*3/mm3      Eosinophils, Absolute 0.08 10*3/mm3      Basophils, Absolute 0.04 10*3/mm3      Immature Grans, Absolute 0.02 10*3/mm3      nRBC 0.0 /100 WBC           Imaging Results (Last 24 Hours)       ** No results found for the last 24 hours. **          Physician Progress Notes (last 24 hours)  Notes from 05/21/25 0728 through 05/22/25 0728   No notes of this type exist for this encounter.       Consult Notes (last 24 hours)  Notes from 05/21/25 0728 through 05/22/25 0728    No notes of this type exist for this encounter.

## 2025-05-22 NOTE — PLAN OF CARE
Goal Outcome Evaluation:           Progress: improving  Outcome Evaluation: ambulating without difficulty, denies need for pain medication. does c/o constipation. prn orders recieved. possible discharge tomorrow

## 2025-05-23 VITALS
RESPIRATION RATE: 16 BRPM | DIASTOLIC BLOOD PRESSURE: 69 MMHG | TEMPERATURE: 98 F | HEART RATE: 76 BPM | OXYGEN SATURATION: 97 % | SYSTOLIC BLOOD PRESSURE: 118 MMHG

## 2025-05-23 LAB
A VAGINAE DNA VAG QL NAA+PROBE: NORMAL SCORE
BVAB2 DNA VAG QL NAA+PROBE: NORMAL SCORE
C ALBICANS DNA VAG QL NAA+PROBE: NEGATIVE
C GLABRATA DNA VAG QL NAA+PROBE: NEGATIVE
C TRACH DNA SPEC QL NAA+PROBE: NEGATIVE
MEGA1 DNA VAG QL NAA+PROBE: NORMAL SCORE
N GONORRHOEA DNA VAG QL NAA+PROBE: NEGATIVE
T VAGINALIS DNA VAG QL NAA+PROBE: NEGATIVE

## 2025-05-23 PROCEDURE — 25010000002 CEFTRIAXONE PER 250 MG: Performed by: STUDENT IN AN ORGANIZED HEALTH CARE EDUCATION/TRAINING PROGRAM

## 2025-05-23 PROCEDURE — 99238 HOSP IP/OBS DSCHRG MGMT 30/<: CPT | Performed by: MIDWIFE

## 2025-05-23 RX ADMIN — METRONIDAZOLE 500 MG: 500 TABLET ORAL at 06:00

## 2025-05-23 RX ADMIN — HYDROCHLOROTHIAZIDE 12.5 MG: 12.5 CAPSULE ORAL at 09:29

## 2025-05-23 RX ADMIN — DOXYCYCLINE 100 MG: 50 CAPSULE ORAL at 09:42

## 2025-05-23 RX ADMIN — CEFTRIAXONE SODIUM 2000 MG: 2 INJECTION, POWDER, FOR SOLUTION INTRAMUSCULAR; INTRAVENOUS at 10:50

## 2025-05-23 RX ADMIN — SENNOSIDES AND DOCUSATE SODIUM 2 TABLET: 50; 8.6 TABLET ORAL at 09:29

## 2025-05-23 NOTE — DISCHARGE SUMMARY
TIMOTHY Kevin  [facility]  : 1980  MRN: [id number]  CSN: [id number]    Discharge Summary      Date of Admission: 2025   Date of Discharge: 2025   Discharge Diagnosis: Left tubo-ovarian abscess   Procedures Performed:       Consults: none   Brief History: Patient is a 44 y.o.who was  admitted from the office on  due to severe LLQ abdominal pain. CT scan showed inflammatory process LLQ. TVS in office showed 9 cm elongated structure consistent with hydrosalpinx in left adnexa   Hospital Course: She has received IV antibiotics x 48 hours. Her pain has resolved. She also received Miralax yesterday and now is having runny stools. She is voiding without difficulty. She is tolerating a regular diet. She has been afebrile. She feels ready for discharge.   Pending Studies: none   Condition at discharge: stable   Discharge Medications:    Your medication list        CONTINUE taking these medications        Instructions Last Dose Given Next Dose Due   ciprofloxacin 500 MG tablet  Commonly known as: CIPRO      Take 1 tablet by mouth 2 (Two) Times a Day for 7 days.       doxycycline 100 MG capsule  Commonly known as: MONODOX      Take 1 capsule by mouth 2 (Two) Times a Day for 14 days.       hydroCHLOROthiazide 12.5 MG tablet      Take 1 tablet by mouth Daily.       ibuprofen 800 MG tablet  Commonly known as: ADVIL,MOTRIN      Take 1 tablet by mouth Every 8 (Eight) Hours As Needed for Mild Pain.       metroNIDAZOLE 500 MG tablet  Commonly known as: FLAGYL      Take 1 tablet by mouth 2 (Two) Times a Day for 14 days.       naloxone 4 MG/0.1ML nasal spray  Commonly known as: NARCAN      Call 911. Don't prime. [place] in 1 nostril for overdose. Repeat in 2-3 minutes in other nostril if no or minimal breathing/responsiveness.              STOP taking these medications      HYDROcodone-acetaminophen 5-325 MG per tablet  Commonly known as: NORCO                Discharge Disposition: home   Follow-up: 2 weeks  with TVS       Time spent on discharge: 30 minutes or less  This note has been electronically signed.    Gypsy Leblanc, APRN  May 23, 2025

## 2025-05-23 NOTE — PLAN OF CARE
Goal Outcome Evaluation:     Pt. Vital signs stable, has had a bowel movement. No pain at this time.   1140- Discharge instructions, warning signs and follow up appointment gone over with patient. Patient verbalized understanding. All questions and concerns addressed.   1200-pt ambulated off floor with spouse at her side

## 2025-05-23 NOTE — PLAN OF CARE
Goal Outcome Evaluation:              Outcome Evaluation: VSS, adequate pain relief,ambulating,offers no c/o's, anticipating discharge

## 2025-05-24 NOTE — PAYOR COMM NOTE
"    D/c SUMMARY  UR MANAGER; ELIZA PAEZ RN  [phone] AND [phone]     NPI:  [id number]  TAX ID:  [id number]        Margoth Weller (44 y.o. Female)       Date of Birth   1980    Social Security Number       Address   [address] [place] [address]    Home Phone   [phone]    MRN   [id number]       Druze   Hoahaoism    Marital Status                               Admission Date   5/21/2025    Admission Type   Urgent    Admitting Provider   Edgar Perez MD    Attending Provider       Department, Room/Bed   [facility], W202/1       Discharge Date   5/23/2025    Discharge Disposition   Home or Self Care    Discharge Destination                                 Attending Provider: (none)   Allergies: No Known Allergies    Isolation: None   Infection: None   Code Status: Prior    Ht: 167.6 cm (66\")   Wt: 98.2 kg (216 lb 9.6 oz)    Admission Cmt: None   Principal Problem: TOA (tubo-ovarian abscess) [N70.93]                   Active Insurance as of 5/21/2025       Primary Coverage       Payor Plan Insurance Group Employer/Plan Group    [employer] BLUE CROSS [employer] BLUE CROSS BLUE [employer] PPO [id number]       Payor Plan Address Payor Plan Phone Number Payor Plan Fax Number Effective Dates    [address] [phone]  4/1/2014 - None Entered    [place] [address]         Subscriber Name Subscriber Birth Date Member ID       SMILEY WELLER 5/13/1976 [id number]                     Emergency Contacts        (Rel.) Home Phone Work Phone Mobile Phone    Mike Weller (Spouse) [phone] -- --              Physician Progress Notes (last 24 hours)  Notes from 05/23/25 0826 through 05/24/25 0826   No notes of this type exist for this encounter.          Discharge Summary        Gypsy Leblanc CNM at 05/23/25 0956       Attestation signed by Amandeep Benedict MD at 05/23/25 1006      I agree with the assessment and plan.    Amandeep " EDWARD Benedict MD  Obstetrics and Gynecology  [facility]  : 1980  MRN: [id number]  CSN: [id number]    Discharge Summary      Date of Admission: 2025   Date of Discharge: 2025   Discharge Diagnosis: Left tubo-ovarian abscess   Procedures Performed:       Consults: none   Brief History: Patient is a 44 y.o.who was  admitted from the office on  due to severe LLQ abdominal pain. CT scan showed inflammatory process LLQ. TVS in office showed 9 cm elongated structure consistent with hydrosalpinx in left adnexa   Hospital Course: She has received IV antibiotics x 48 hours. Her pain has resolved. She also received Miralax yesterday and now is having runny stools. She is voiding without difficulty. She is tolerating a regular diet. She has been afebrile. She feels ready for discharge.   Pending Studies: none   Condition at discharge: stable   Discharge Medications:    Your medication list        CONTINUE taking these medications        Instructions Last Dose Given Next Dose Due   ciprofloxacin 500 MG tablet  Commonly known as: CIPRO      Take 1 tablet by mouth 2 (Two) Times a Day for 7 days.       doxycycline 100 MG capsule  Commonly known as: MONODOX      Take 1 capsule by mouth 2 (Two) Times a Day for 14 days.       hydroCHLOROthiazide 12.5 MG tablet      Take 1 tablet by mouth Daily.       ibuprofen 800 MG tablet  Commonly known as: ADVIL,MOTRIN      Take 1 tablet by mouth Every 8 (Eight) Hours As Needed for Mild Pain.       metroNIDAZOLE 500 MG tablet  Commonly known as: FLAGYL      Take 1 tablet by mouth 2 (Two) Times a Day for 14 days.       naloxone 4 MG/0.1ML nasal spray  Commonly known as: NARCAN      Call 911. Don't prime. Drain in 1 nostril for overdose. Repeat in 2-3 minutes in other nostril if no or minimal breathing/responsiveness.              STOP taking these medications      HYDROcodone-acetaminophen 5-325 MG per  tablet  Commonly known as: NORCO                Discharge Disposition: home   Follow-up: 2 weeks with TVS       Time spent on discharge: 30 minutes or less  This note has been electronically signed.    Gypsy Leblanc, APRN  May 23, 2025      Electronically signed by Amandeep Benedict MD at 05/23/25 1003

## 2025-05-26 LAB
BACTERIA SPEC AEROBE CULT: NORMAL
BACTERIA SPEC AEROBE CULT: NORMAL

## 2025-06-11 ENCOUNTER — OFFICE VISIT (OUTPATIENT)
Dept: OBSTETRICS AND GYNECOLOGY | Facility: CLINIC | Age: 45
End: 2025-06-11
Payer: COMMERCIAL

## 2025-06-11 VITALS
HEIGHT: 66 IN | DIASTOLIC BLOOD PRESSURE: 64 MMHG | BODY MASS INDEX: 35.17 KG/M2 | SYSTOLIC BLOOD PRESSURE: 120 MMHG | WEIGHT: 218.8 LBS

## 2025-06-11 DIAGNOSIS — N70.93 TOA (TUBO-OVARIAN ABSCESS): Primary | ICD-10-CM

## 2025-06-11 PROCEDURE — 99213 OFFICE O/P EST LOW 20 MIN: CPT | Performed by: OBSTETRICS & GYNECOLOGY

## 2025-06-11 NOTE — PROGRESS NOTES
Subjective   Chief Complaint   Patient presents with    Follow-up     2 Week follow up for TVS done today     Margoth Weller is a 44 y.o. year old .  Patient's last menstrual period was 2025.  She presents to be seen because of follow-up of left sigmoid colitis versus TOA.  Patient is overall doing well without complaints.  She was admitted with IV antibiotics and after essentially 48 hours her symptoms abated.  She finished her course of oral antibiotics and has had no issues..     OTHER COMPLAINTS:  Nothing else    The following portions of the patient's history were reviewed and updated as appropriate:She  has a past medical history of Allergic (1990), Bladder infection, Cholelithiasis, Depression, Ectopic pregnancy, History of blood transfusion, History of Papanicolaou smear of cervix (2014), History of urinary tract infection, Obesity, Other abnormal clinical finding, PONV (postoperative nausea and vomiting), Varicella, and Vertigo.  She does not have any pertinent problems on file.  She  has a past surgical history that includes Tubal ligation; Dilation and curettage, diagnostic / therapeutic; [place] tooth extraction; cholecystectomy with intraoperative cholangiogram (N/A, 2017); knee arthroscopy cartilage biopsy (Left); Salpingectomy (Right); laparoscopic lysis of adhesions (); d & c with suction; and Laparoscopic cholecystectomy.  Her family history includes Arrhythmia in her paternal grandfather; Breast cancer in her mother; Breast cancer (age of onset: 45) in her maternal aunt; Cancer in her maternal aunt; Diabetes in her mother; Heart attack in her paternal grandfather and paternal grandmother; Heart disease in her maternal grandmother, paternal grandfather, and paternal grandmother; Heart failure in her maternal grandmother and mother; Hyperlipidemia in her paternal grandfather and paternal grandmother; Hypertension in her paternal grandfather and paternal  "grandmother; No Known Problems in her brother and brother.  She  reports that she has never smoked. She has never used smokeless tobacco. She reports current alcohol use. She reports that she does not use drugs.  Current Outpatient Medications   Medication Sig Dispense Refill    hydroCHLOROthiazide 12.5 MG tablet Take 1 tablet by mouth Daily.      ibuprofen (ADVIL,MOTRIN) 800 MG tablet Take 1 tablet by mouth Every 8 (Eight) Hours As Needed for Mild Pain. 15 tablet 0    naloxone (NARCAN) 4 MG/0.1ML nasal spray Call 911. Don't prime. Corona in 1 nostril for overdose. Repeat in 2-3 minutes in other nostril if no or minimal breathing/responsiveness. 2 each 0     No current facility-administered medications for this visit.     Current Outpatient Medications on File Prior to Visit   Medication Sig    hydroCHLOROthiazide 12.5 MG tablet Take 1 tablet by mouth Daily.    ibuprofen (ADVIL,MOTRIN) 800 MG tablet Take 1 tablet by mouth Every 8 (Eight) Hours As Needed for Mild Pain.    naloxone (NARCAN) 4 MG/0.1ML nasal spray Call 911. Don't prime. Corona in 1 nostril for overdose. Repeat in 2-3 minutes in other nostril if no or minimal breathing/responsiveness.     No current facility-administered medications on file prior to visit.     She has no known allergies.    Social History    Tobacco Use      Smoking status: Never      Smokeless tobacco: Never    Review of Systems  Consitutional POS: nothing reported    NEG: anorexia or night sweats   Gastointestinal POS: nothing reported    NEG: bloating, change in bowel habits, melena, or reflux symptoms   Genitourinary POS: nothing reported    NEG: dysuria or hematuria   Integument POS: nothing reported    NEG: moles that are changing in size, shape, color or rashes   Breast POS: nothing reported    NEG: persistent breast lump, skin dimpling, or nipple discharge         Respiratory: negative  Cardiovascular: negative  GYN:  negative          Objective   /64   Ht 167.6 cm (66\")   " Wt 99.2 kg (218 lb 12.8 oz)   LMP 05/13/2025   BMI 35.32 kg/m²     General:  well developed; well nourished  no acute distress  mentation appropriate   Skin:  No suspicious lesions seen   Thyroid: not examined   Lungs:  breathing is unlabored   Heart:  Not performed.   Breasts:  Not performed.   Abdomen: soft, non-tender; no masses  no umbilical or inguinal hernias are present  no hepato-splenomegaly   Pelvis: Not performed.     Psychiatric: Alert and oriented ×3, mood and affect appropriate  HEENT: Atraumatic, normocephalic, normal scleral icterus  Extremities: 2+ pulses bilaterally, no edema      Lab Review   CBC    Imaging   CT of abdomen/pelvis report  Pelvic ultrasound report   Ultrasound today compared with prior imaging.  Anteverted uterus.  Small fibroids unchanged intramural.  Overall uterine volume 276 cm³.  Endometrium 12.95 mm.  Normal adnexa bilaterally.  Trace free fluid.  No masses seen.  Left adnexal structure not noted when compared with prior     Assessment   Resolved pelvic infection.  Statistically more likely that this was sigmoid in origin but will never definitively know what the most important is patient is recovered and doing well with a normal ultrasound today.     Plan   Questions answered and images reviewed.  Call with any questions or concerns.      No orders of the defined types were placed in this encounter.         This note was electronically signed.      June 11, 2025

## 2025-07-18 ENCOUNTER — TRANSCRIBE ORDERS (OUTPATIENT)
Dept: ADMINISTRATIVE | Facility: HOSPITAL | Age: 45
End: 2025-07-18
Payer: COMMERCIAL

## 2025-07-18 DIAGNOSIS — Z12.31 SCREENING MAMMOGRAM FOR BREAST CANCER: Primary | ICD-10-CM

## 2025-08-12 ENCOUNTER — TRANSCRIBE ORDERS (OUTPATIENT)
Dept: ADMINISTRATIVE | Facility: HOSPITAL | Age: 45
End: 2025-08-12
Payer: COMMERCIAL

## 2025-08-12 DIAGNOSIS — K57.20 PERFORATED DIVERTICULUM OF LARGE INTESTINE: Primary | ICD-10-CM

## (undated) DEVICE — KT CATH CHOLANGIOGRA PERC W/BALLO

## (undated) DEVICE — SPNG GZ WOVN 4X4IN 12PLY 10/BX STRL

## (undated) DEVICE — DISPOSABLE MONOPOLAR ENDOSCOPIC CORD 10 FT. (3M): Brand: KIRWAN

## (undated) DEVICE — CLIP APPLIER: Brand: ENDO CLIP

## (undated) DEVICE — GLV SURG TRIUMPH LT PF LTX 7.5 STRL

## (undated) DEVICE — TRY SKINPREP PVP SCRB W PAINT

## (undated) DEVICE — 3M™ STERI-DRAPE™ INCISE DRAPE 1050 (60CM X 45CM): Brand: STERI-DRAPE™

## (undated) DEVICE — UNDYED MONOFILAMENT (POLYDIOXANONE), ABSORBABLE SYNTHETIC SURGICAL SUTURE: Brand: PDS

## (undated) DEVICE — 2, DISPOSABLE SUCTION/IRRIGATOR WITHOUT DISPOSABLE TIP: Brand: STRYKEFLOW

## (undated) DEVICE — SUT PDS 0 CT2 27IN DYED Z334H

## (undated) DEVICE — SUT VIC 0/0 UR6 27IN DYED J603H

## (undated) DEVICE — ENDOPATH XCEL BLADELESS TROCARS WITH STABILITY SLEEVES: Brand: ENDOPATH XCEL

## (undated) DEVICE — IRRIGATOR BULB ASEPTO 60CC STRL

## (undated) DEVICE — TROCAR SITE CLOSURE DEVICE: Brand: ENDO CLOSE

## (undated) DEVICE — ENDOPATH XCEL UNIVERSAL TROCAR STABLILITY SLEEVES: Brand: ENDOPATH XCEL

## (undated) DEVICE — BLD MICROSHARP 15D 3MM BLU LF

## (undated) DEVICE — TUBING, SUCTION, 1/4" X 12', STRAIGHT: Brand: MEDLINE

## (undated) DEVICE — SPECIALTY ABSORPTIVE DRSG: Brand: DEROYAL

## (undated) DEVICE — SHEET,DRAPE,70X100,STERILE: Brand: MEDLINE

## (undated) DEVICE — CATH URETRL PA CONE TP 8F

## (undated) DEVICE — TP ELECTRD LAP L WR SPLIT33CM

## (undated) DEVICE — WAND INSULSCAN BX50 STRL

## (undated) DEVICE — FLEXIBLE YANKAUER,MEDIUM TIP, NO VACUUM CONTROL: Brand: ARGYLE

## (undated) DEVICE — SUT PDS 4/0 SH 27IN VIO Z315H

## (undated) DEVICE — CONTROL SYRINGE LUER-LOCK TIP: Brand: MONOJECT

## (undated) DEVICE — RICH GENERAL LAPAROSCOPY: Brand: MEDLINE INDUSTRIES, INC.

## (undated) DEVICE — ENDOPATH XCEL DILATING TIP TROCARS WITH STABILITY SLEEVES: Brand: ENDOPATH XCEL

## (undated) DEVICE — TOWEL,OR,DSP,ST,BLUE,STD,4/PK,20PK/CS: Brand: MEDLINE

## (undated) DEVICE — Device

## (undated) DEVICE — STRIP,CLOSURE,WOUND,MEDI-STRIP,1/4X3: Brand: MEDLINE

## (undated) DEVICE — CUFF SCD HEMOFORCE SEQ CALF STD MD

## (undated) DEVICE — SYR LUERLOK 10CC